# Patient Record
Sex: FEMALE | Race: WHITE | URBAN - METROPOLITAN AREA
[De-identification: names, ages, dates, MRNs, and addresses within clinical notes are randomized per-mention and may not be internally consistent; named-entity substitution may affect disease eponyms.]

---

## 2019-04-02 ENCOUNTER — INPATIENT (INPATIENT)
Facility: HOSPITAL | Age: 79
LOS: 1 days | Discharge: HOME | End: 2019-04-04
Attending: INTERNAL MEDICINE | Admitting: INTERNAL MEDICINE
Payer: MEDICARE

## 2019-04-02 VITALS
SYSTOLIC BLOOD PRESSURE: 190 MMHG | HEIGHT: 65 IN | WEIGHT: 154.98 LBS | TEMPERATURE: 98 F | DIASTOLIC BLOOD PRESSURE: 92 MMHG | RESPIRATION RATE: 18 BRPM | HEART RATE: 64 BPM | OXYGEN SATURATION: 98 %

## 2019-04-02 DIAGNOSIS — Z98.49 CATARACT EXTRACTION STATUS, UNSPECIFIED EYE: Chronic | ICD-10-CM

## 2019-04-02 LAB
ALBUMIN SERPL ELPH-MCNC: 4.8 G/DL — SIGNIFICANT CHANGE UP (ref 3.5–5.2)
ALP SERPL-CCNC: 93 U/L — SIGNIFICANT CHANGE UP (ref 30–115)
ALT FLD-CCNC: 29 U/L — SIGNIFICANT CHANGE UP (ref 0–41)
ANION GAP SERPL CALC-SCNC: 14 MMOL/L — SIGNIFICANT CHANGE UP (ref 7–14)
APPEARANCE UR: CLEAR — SIGNIFICANT CHANGE UP
AST SERPL-CCNC: 29 U/L — SIGNIFICANT CHANGE UP (ref 0–41)
BASOPHILS # BLD AUTO: 0.03 K/UL — SIGNIFICANT CHANGE UP (ref 0–0.2)
BASOPHILS NFR BLD AUTO: 0.4 % — SIGNIFICANT CHANGE UP (ref 0–1)
BILIRUB SERPL-MCNC: 0.6 MG/DL — SIGNIFICANT CHANGE UP (ref 0.2–1.2)
BILIRUB UR-MCNC: NEGATIVE — SIGNIFICANT CHANGE UP
BUN SERPL-MCNC: 11 MG/DL — SIGNIFICANT CHANGE UP (ref 10–20)
CALCIUM SERPL-MCNC: 10.2 MG/DL — HIGH (ref 8.5–10.1)
CHLORIDE SERPL-SCNC: 100 MMOL/L — SIGNIFICANT CHANGE UP (ref 98–110)
CO2 SERPL-SCNC: 27 MMOL/L — SIGNIFICANT CHANGE UP (ref 17–32)
COLOR SPEC: YELLOW — SIGNIFICANT CHANGE UP
CREAT SERPL-MCNC: 0.9 MG/DL — SIGNIFICANT CHANGE UP (ref 0.7–1.5)
DIFF PNL FLD: NEGATIVE — SIGNIFICANT CHANGE UP
EOSINOPHIL # BLD AUTO: 0.01 K/UL — SIGNIFICANT CHANGE UP (ref 0–0.7)
EOSINOPHIL NFR BLD AUTO: 0.1 % — SIGNIFICANT CHANGE UP (ref 0–8)
EPI CELLS # UR: ABNORMAL /HPF
ETHANOL SERPL-MCNC: <10 MG/DL — SIGNIFICANT CHANGE UP
GLUCOSE SERPL-MCNC: 131 MG/DL — HIGH (ref 70–99)
GLUCOSE UR QL: NEGATIVE MG/DL — SIGNIFICANT CHANGE UP
HCT VFR BLD CALC: 47.1 % — HIGH (ref 37–47)
HGB BLD-MCNC: 15.5 G/DL — SIGNIFICANT CHANGE UP (ref 12–16)
IMM GRANULOCYTES NFR BLD AUTO: 0.4 % — HIGH (ref 0.1–0.3)
KETONES UR-MCNC: NEGATIVE — SIGNIFICANT CHANGE UP
LEUKOCYTE ESTERASE UR-ACNC: ABNORMAL
LYMPHOCYTES # BLD AUTO: 0.68 K/UL — LOW (ref 1.2–3.4)
LYMPHOCYTES # BLD AUTO: 8.5 % — LOW (ref 20.5–51.1)
MCHC RBC-ENTMCNC: 31.1 PG — HIGH (ref 27–31)
MCHC RBC-ENTMCNC: 32.9 G/DL — SIGNIFICANT CHANGE UP (ref 32–37)
MCV RBC AUTO: 94.6 FL — SIGNIFICANT CHANGE UP (ref 81–99)
MONOCYTES # BLD AUTO: 0.33 K/UL — SIGNIFICANT CHANGE UP (ref 0.1–0.6)
MONOCYTES NFR BLD AUTO: 4.1 % — SIGNIFICANT CHANGE UP (ref 1.7–9.3)
NEUTROPHILS # BLD AUTO: 6.94 K/UL — HIGH (ref 1.4–6.5)
NEUTROPHILS NFR BLD AUTO: 86.5 % — HIGH (ref 42.2–75.2)
NITRITE UR-MCNC: NEGATIVE — SIGNIFICANT CHANGE UP
NRBC # BLD: 0 /100 WBCS — SIGNIFICANT CHANGE UP (ref 0–0)
PH UR: 8 — SIGNIFICANT CHANGE UP (ref 5–8)
PLATELET # BLD AUTO: 172 K/UL — SIGNIFICANT CHANGE UP (ref 130–400)
POTASSIUM SERPL-MCNC: 4.1 MMOL/L — SIGNIFICANT CHANGE UP (ref 3.5–5)
POTASSIUM SERPL-SCNC: 4.1 MMOL/L — SIGNIFICANT CHANGE UP (ref 3.5–5)
PROT SERPL-MCNC: 7.7 G/DL — SIGNIFICANT CHANGE UP (ref 6–8)
PROT UR-MCNC: ABNORMAL MG/DL
RBC # BLD: 4.98 M/UL — SIGNIFICANT CHANGE UP (ref 4.2–5.4)
RBC # FLD: 12.9 % — SIGNIFICANT CHANGE UP (ref 11.5–14.5)
SODIUM SERPL-SCNC: 141 MMOL/L — SIGNIFICANT CHANGE UP (ref 135–146)
SP GR SPEC: 1.01 — SIGNIFICANT CHANGE UP (ref 1.01–1.03)
TROPONIN T SERPL-MCNC: <0.01 NG/ML — SIGNIFICANT CHANGE UP
UROBILINOGEN FLD QL: 0.2 MG/DL — SIGNIFICANT CHANGE UP (ref 0.2–0.2)
WBC # BLD: 8.02 K/UL — SIGNIFICANT CHANGE UP (ref 4.8–10.8)
WBC # FLD AUTO: 8.02 K/UL — SIGNIFICANT CHANGE UP (ref 4.8–10.8)
WBC UR QL: SIGNIFICANT CHANGE UP /HPF

## 2019-04-02 PROCEDURE — 99285 EMERGENCY DEPT VISIT HI MDM: CPT

## 2019-04-02 PROCEDURE — 70450 CT HEAD/BRAIN W/O DYE: CPT | Mod: 26

## 2019-04-02 RX ORDER — METOCLOPRAMIDE HCL 10 MG
10 TABLET ORAL THREE TIMES A DAY
Qty: 0 | Refills: 0 | Status: DISCONTINUED | OUTPATIENT
Start: 2019-04-02 | End: 2019-04-04

## 2019-04-02 RX ORDER — ATORVASTATIN CALCIUM 80 MG/1
0 TABLET, FILM COATED ORAL
Qty: 0 | Refills: 0 | COMMUNITY

## 2019-04-02 RX ORDER — ACETAMINOPHEN 500 MG
650 TABLET ORAL ONCE
Qty: 0 | Refills: 0 | Status: COMPLETED | OUTPATIENT
Start: 2019-04-02 | End: 2019-04-02

## 2019-04-02 RX ORDER — MECLIZINE HCL 12.5 MG
25 TABLET ORAL EVERY 8 HOURS
Qty: 0 | Refills: 0 | Status: DISCONTINUED | OUTPATIENT
Start: 2019-04-02 | End: 2019-04-04

## 2019-04-02 RX ORDER — THIAMINE MONONITRATE (VIT B1) 100 MG
100 TABLET ORAL ONCE
Qty: 0 | Refills: 0 | Status: COMPLETED | OUTPATIENT
Start: 2019-04-02 | End: 2019-04-02

## 2019-04-02 RX ORDER — SOLIFENACIN SUCCINATE 10 MG/1
0 TABLET ORAL
Qty: 0 | Refills: 0 | COMMUNITY

## 2019-04-02 RX ORDER — FOLIC ACID 0.8 MG
1 TABLET ORAL ONCE
Qty: 0 | Refills: 0 | Status: COMPLETED | OUTPATIENT
Start: 2019-04-02 | End: 2019-04-02

## 2019-04-02 RX ORDER — ATORVASTATIN CALCIUM 80 MG/1
40 TABLET, FILM COATED ORAL AT BEDTIME
Qty: 0 | Refills: 0 | Status: DISCONTINUED | OUTPATIENT
Start: 2019-04-02 | End: 2019-04-04

## 2019-04-02 RX ORDER — MECLIZINE HCL 12.5 MG
50 TABLET ORAL ONCE
Qty: 0 | Refills: 0 | Status: COMPLETED | OUTPATIENT
Start: 2019-04-02 | End: 2019-04-02

## 2019-04-02 RX ORDER — CHLORHEXIDINE GLUCONATE 213 G/1000ML
1 SOLUTION TOPICAL
Qty: 0 | Refills: 0 | Status: DISCONTINUED | OUTPATIENT
Start: 2019-04-02 | End: 2019-04-04

## 2019-04-02 RX ORDER — OXYBUTYNIN CHLORIDE 5 MG
5 TABLET ORAL
Qty: 0 | Refills: 0 | Status: DISCONTINUED | OUTPATIENT
Start: 2019-04-02 | End: 2019-04-04

## 2019-04-02 RX ORDER — ACETAMINOPHEN 500 MG
650 TABLET ORAL EVERY 6 HOURS
Qty: 0 | Refills: 0 | Status: DISCONTINUED | OUTPATIENT
Start: 2019-04-02 | End: 2019-04-04

## 2019-04-02 RX ORDER — METOCLOPRAMIDE HCL 10 MG
5 TABLET ORAL ONCE
Qty: 0 | Refills: 0 | Status: COMPLETED | OUTPATIENT
Start: 2019-04-02 | End: 2019-04-02

## 2019-04-02 RX ORDER — ENOXAPARIN SODIUM 100 MG/ML
40 INJECTION SUBCUTANEOUS EVERY 24 HOURS
Qty: 0 | Refills: 0 | Status: DISCONTINUED | OUTPATIENT
Start: 2019-04-02 | End: 2019-04-04

## 2019-04-02 RX ORDER — THIAMINE MONONITRATE (VIT B1) 100 MG
100 TABLET ORAL DAILY
Qty: 0 | Refills: 0 | Status: DISCONTINUED | OUTPATIENT
Start: 2019-04-03 | End: 2019-04-04

## 2019-04-02 RX ORDER — FOLIC ACID 0.8 MG
1 TABLET ORAL DAILY
Qty: 0 | Refills: 0 | Status: DISCONTINUED | OUTPATIENT
Start: 2019-04-03 | End: 2019-04-04

## 2019-04-02 RX ADMIN — Medication 50 MILLIGRAM(S): at 14:43

## 2019-04-02 RX ADMIN — Medication 650 MILLIGRAM(S): at 14:44

## 2019-04-02 RX ADMIN — ATORVASTATIN CALCIUM 40 MILLIGRAM(S): 80 TABLET, FILM COATED ORAL at 21:55

## 2019-04-02 RX ADMIN — Medication 100 MILLIGRAM(S): at 16:01

## 2019-04-02 RX ADMIN — Medication 1 MILLIGRAM(S): at 16:01

## 2019-04-02 RX ADMIN — ENOXAPARIN SODIUM 40 MILLIGRAM(S): 100 INJECTION SUBCUTANEOUS at 21:54

## 2019-04-02 RX ADMIN — Medication 25 MILLIGRAM(S): at 21:55

## 2019-04-02 RX ADMIN — Medication 5 MILLIGRAM(S): at 14:44

## 2019-04-02 NOTE — H&P ADULT - NSHPLABSRESULTS_GEN_ALL_CORE
Complete Blood Count + Automated Diff (04.02.19 @ 12:08)    WBC Count: 8.02 K/uL    RBC Count: 4.98 M/uL    Hemoglobin: 15.5 g/dL    Hematocrit: 47.1 %    Mean Cell Volume: 94.6 fL    Mean Cell Hemoglobin: 31.1 pg    Mean Cell Hemoglobin Conc: 32.9 g/dL    Red Cell Distrib Width: 12.9 %    Platelet Count - Automated: 172 K/uL    Auto Neutrophil #: 6.94 K/uL    Auto Lymphocyte #: 0.68 K/uL    Auto Monocyte #: 0.33 K/uL    Auto Eosinophil #: 0.01 K/uL    Auto Basophil #: 0.03 K/uL    Auto Neutrophil %: 86.5: Differential percentages must be correlated with absolute numbers for  clinical significance. %    Auto Lymphocyte %: 8.5 %    Auto Monocyte %: 4.1 %    Auto Eosinophil %: 0.1 %    Auto Basophil %: 0.4 %    Auto Immature Granulocyte %: 0.4 %    Nucleated RBC: 0 /100 WBCs    Comprehensive Metabolic Panel (04.02.19 @ 12:08)    Sodium, Serum: 141 mmol/L    Potassium, Serum: 4.1 mmol/L    Chloride, Serum: 100 mmol/L    Carbon Dioxide, Serum: 27 mmol/L    Anion Gap, Serum: 14 mmol/L    Blood Urea Nitrogen, Serum: 11 mg/dL    Creatinine, Serum: 0.9 mg/dL    Glucose, Serum: 131 mg/dL    Calcium, Total Serum: 10.2 mg/dL    Protein Total, Serum: 7.7 g/dL    Albumin, Serum: 4.8 g/dL    Bilirubin Total, Serum: 0.6 mg/dL    Alkaline Phosphatase, Serum: 93 U/L    Aspartate Aminotransferase (AST/SGOT): 29 U/L    Alanine Aminotransferase (ALT/SGPT): 29 U/L    eGFR if Non : 62    eGFR if : 72 mL/min/1.73M2    Troponin T, Serum (04.02.19 @ 12:08)    Troponin T, Serum: <0.01 ng/mL    Urinalysis (04.02.19 @ 12:08)    Glucose Qualitative, Urine: Negative mg/dL    Blood, Urine: Negative    pH Urine: 8.0    Color: Yellow    Urine Appearance: Clear    Bilirubin: Negative    Ketone - Urine: Negative    Specific Gravity: 1.010    Protein, Urine: Trace mg/dL    Urobilinogen: 0.2 mg/dL    Nitrite: Negative    Leukocyte Esterase Concentration: Trace    White Blood Cell - Urine: 3-5 /HPF    Epithelial Cells: Occasional /HPF    Alcohol, Blood (04.02.19 @ 12:08)    Alcohol, Blood: <10 mg/dL    < from: 12 Lead ECG (04.02.19 @ 12:19) >  Ventricular Rate 71 BPM  Atrial Rate 71 BPM  QTC Calculation(Bezet) 471 ms  Diagnosis Line Normal sinus rhythm  Left axis deviation  Left anterior fascicular block  < end of copied text >    < from: CT Head No Cont (04.02.19 @ 14:38) >  IMPRESSION:  1.  Cerebral and cerebellar atrophy.  2.  Periventricular white matter hypodensities, nonspecific, differential   diagnostic possibilities include ischemic change, gliosis or   demyelination.  < end of copied text >

## 2019-04-02 NOTE — H&P ADULT - HISTORY OF PRESENT ILLNESS
77 y/o F PMHx DLD presenting with episode of dizziness.   Pt says she awoke at 4am and turned over in bed and felt a sudden dizziness (vertigo) with associated headache, sweating and nausea. She says it was a feeling of near unconsciousness but was aware the whole time and had no LOC. She says she stayed in bed until about 7am when she cautiously got out of bed. She says she then dropped something on the floor and when she bent down to get it she felt very lightheaded and weak as if she would pass out so she laid back down. She called her son who sent EMS.  She notes some mild vision changes during these episodes but relates them to the headaches as they resolved when the headache did.  She notes multiple additional short episodes while in the ambulance and in ED generally when moving around but none since getting medications in ED.    Pt seen by neurology PA with rec for admission for MRI to r/o CVA    ED: meclizine, reglan, tylenol, folic acid, thiamine

## 2019-04-02 NOTE — ED PROVIDER NOTE - PROGRESS NOTE DETAILS
I was directly involved in the management of this patient. Case was discussed with PA Rebecca Moraes Pt signed out to me by Dr. Chan while awaiting neuro recs.  Neuro recommend medicine admission for further evaluation.

## 2019-04-02 NOTE — ED PROVIDER NOTE - NS ED ROS FT
Review of Systems         Constitutional: (-) fever (-) chills (-) weakness       Head: (-) trauma       EENT: (+) visual changes (-) sore throat       Cardiovascular: (-) chest pain (-) syncope       Respiratory: (-) cough, (-) shortness of breath       Gastrointestinal: (-) abdominal pain (-) vomiting (-) diarrhea (+) nausea (-) constipation       Genitourinary: (-) dysuria (-) frequency       Musculoskeletal: (-) neck pain (-) back pain (-) joint pain       Integumentary: (-) rash       Neurological: (+) headache (-) altered mental status (+) dizziness (-) paresthesias       Psych: (-) psych history

## 2019-04-02 NOTE — H&P ADULT - NSHPPHYSICALEXAM_GEN_ALL_CORE
GENERAL: NAD  HEENT: NCAT, left pupil post surgical changes, reactive to light b/l   CHEST/LUNG: CTAB  HEART: Regular rate and rhythm with occasional ectopic beats ; s1 s2 appreciated, No murmurs, rubs, or gallops  ABDOMEN: Soft, Nontender, Nondistended; Bowel sounds present  EXTREMITIES: No LE edema b/l  NERVOUS SYSTEM:  Alert & Oriented X3, CN 2-12 intact, 5/5 power x4 extremities, FTN / HTS intact, negative Dysdiadochokinesia, gait not checked.

## 2019-04-02 NOTE — ED PROVIDER NOTE - PHYSICAL EXAMINATION
Physical Exam    Vital Signs: I have reviewed the initial vital signs  Constitutional: well-nourished, appears stated age, no acute distress  HEENT: Conjunctiva pink, Sclera clear, PERRLA, EOMI, difficulty in tracking to the right. Mucous membranes moist, no exudates or lesions noted, uvula midline.  Cardiovascular: S1 and S2 present, regular rate, regular rhythm  Respiratory: unlabored respiratory effort, clear to auscultation bilaterally no wheezing, rales and rhonchi  Integumentary: warm, dry, no rash  Neurologic: A & O x 3, CN II-XII grossly intact, all extremities’ motor and sensory functions grossly intact. Finger to nose intact, rapid alternating movements intact, Unsteady gait, Heel and toe walk intact, heel to shin intact, no nystagmus noted  Psychiatric: appropriate mood, appropriate affect

## 2019-04-02 NOTE — ED ADULT NURSE NOTE - NSIMPLEMENTINTERV_GEN_ALL_ED
Implemented All Fall with Harm Risk Interventions:  [place] to call system. Call bell, personal items and telephone within reach. Instruct patient to call for assistance. Room bathroom lighting operational. Non-slip footwear when patient is off stretcher. Physically safe environment: no spills, clutter or unnecessary equipment. Stretcher in lowest position, wheels locked, appropriate side rails in place. Provide visual cue, wrist band, yellow gown, etc. Monitor gait and stability. Monitor for mental status changes and reorient to person, place, and time. Review medications for side effects contributing to fall risk. Reinforce activity limits and safety measures with patient and family. Provide visual clues: red socks.

## 2019-04-02 NOTE — H&P ADULT - ASSESSMENT
77 y/o F PMHx DLD presenting with episode of dizziness.    # Dizziness  -worsened with positional changes  -NIHSS= 0  -improved with meclizine  -Neuro recs appreciated  -admit to medicine to r/o CVA  -order MRI brain, MRA head and neck (neck with ADELINA)    # EtOH use  -pt notes only on weekends but per ED notes family says daily intake  -alcohol level <10  -will check folate and B12  -c/w thiamine and folate for now    # HLD   -c/w lipitor    # Urinary Incontinence   -oxybutynin in place of vesicare    # DVT ppx  -lovenox    # Diet  -low cholesterol    # Activity  -ambulate with assistance    # Code Status  -Full Code    # Dispo  -from home, d/c when medically stable 79 y/o F PMHx DLD presenting with episode of dizziness.    # Dizziness  -worsened with positional changes  -NIHSS= 0  -improved with meclizine  -CTH with likely age related changes  -Neuro recs appreciated  -admit to medicine to r/o CVA  -order MRI brain, MRA head and neck (neck with ADELINA)  -c/w meclizine, reglan PRN   -neuro follow up    # EtOH use  -pt notes only on weekends but per ED notes family says daily intake  -alcohol level <10  -will check folate and B12  -c/w thiamine and folate for now    # HLD   -c/w lipitor    # Urinary Incontinence   -oxybutynin in place of vesicare    # DVT ppx  -lovenox    # Diet  -low cholesterol    # Activity  -ambulate with assistance    # Code Status  -Full Code    # Dispo  -from home, d/c when medically stable

## 2019-04-02 NOTE — ED ADULT NURSE NOTE - OBJECTIVE STATEMENT
pt presents with dizziness, lightheadedness, nausea, headache, and states "feels like the room is spinning." denies fever, vomiting. started this morning at 4 AM.

## 2019-04-02 NOTE — PATIENT PROFILE ADULT - NSPROGENARRIVEDFROM_GEN_A_NUR
Patient advised that Levothyroxine Refilled for 6 months. Please let patient know TSH is stable.    home

## 2019-04-02 NOTE — CONSULT NOTE ADULT - SUBJECTIVE AND OBJECTIVE BOX
HPI:  79 y/o female with PMH hyperlipidemia, urinary incontinence presents with dizziness and near syncope which occurred at 4 AM today. Sx became worse when she stood up. The sensation of spinning woke patient out of her sleep? and then she felt like she was going to pass out.  Was treated in ED with meclizine, reglan, folic acid and thiamine and is currently asymptomatic.  Additionally, pt c/o b/l floaters, intermittent for 1 year since having eye surgery for cataract    PAST MEDICAL & SURGICAL HISTORY:  High cholesterol  Incontinence: urinary  No significant past surgical history    Social Hx:  no smoking  + ETOH 3 glasses of wine 3x/week. Last drink 5 days ago    Home Medications:  Lipitor:  (02 Apr 2019 12:09)  VESIcare:  (02 Apr 2019 12:09)    ROS:  Constitutional, Neurological, Psychiatric, Eyes, ENT, Cardiovascular, Respiratory, Gastrointestinal, Genitourinary, Musculoskeletal, Integumentary, Endocrine and Heme/Lymph are otherwise negative.     Vital Signs Last 24 Hrs  T(C): 36.7 (02 Apr 2019 10:56), Max: 36.7 (02 Apr 2019 10:56)  T(F): 98 (02 Apr 2019 10:56), Max: 98 (02 Apr 2019 10:56)  HR: 64 (02 Apr 2019 10:56) (64 - 64)  BP: 190/92 (02 Apr 2019 10:56) (190/92 - 190/92)  RR: 18 (02 Apr 2019 10:56) (18 - 18)  SpO2: 98% (02 Apr 2019 10:56) (98% - 98%)    no orthostatic changes    Neuro Exam:  Orientation: oriented to person, oriented to place and oriented to time.   Language: no difficulty naming common objects, no difficulty repeating a phrase  Cranial Nerves: visual acuity intact bilaterally, visual fields full to confrontation, pupils equal round and reactive to light, extraocular motion intact, facial sensation intact symmetrically, face symmetrical, hearing was intact bilaterally, tongue and palate midline, head turning and shoulder shrug symmetric and there was no tongue deviation with protrusion.   Motor: muscle tone was normal in all four extremities, muscle strength was normal in all four extremities and normal bulk in all four extremities.   Sensory exam: light touch was intact.   Coordination:. normal gait. balance was intact. there was no past-pointing. no tremor present.   Deep tendon reflexes:   Biceps right 2+. Biceps left 2+.    Triceps right 2+. Triceps left 2+.   Brachioradialis right 2+. Brachioradialis left 2+.    Patella right 3+. Patella left 3+.    Ankle jerk right 2+. Ankle jerk left 2+.     NIHSS: 0    Allergies    No Known Allergies    Intolerances      MEDICATIONS  (STANDING):    MEDICATIONS  (PRN):      LABS:                        15.5   8.02  )-----------( 172      ( 02 Apr 2019 12:08 )             47.1     04-02    141  |  100  |  11  ----------------------------<  131<H>  4.1   |  27  |  0.9    Ca    10.2<H>      02 Apr 2019 12:08    TPro  7.7  /  Alb  4.8  /  TBili  0.6  /  DBili  x   /  AST  29  /  ALT  29  /  AlkPhos  93  04-02      Neuro Imaging:    < from: CT Head No Cont (04.02.19 @ 14:38) >    The third and lateral ventricles are mildly enlarged as are the cortical   sulci consistent with a mild degree of cortical atrophy.  The fourth   ventricle is normal in size and position.    There is no shift of the midline structures, evidence of acute   intracranial hemorrhage, or depressed skull fracture.    Patchy foci of diminished attenuation in the periventricular white   matter. These findings are nonspecific in appearance and differential   diagnostic possibilities include ischemic change of undetermined age,   foci of gliosis or demyelination.    Minimal widening of the cerebellopontine angle cisterns and minimal   prominence of thecerebellar folia consistent with a minimal degree of   cerebellar atrophy.    The right frontal sinus is hypoplastic.    IMPRESSION:    1.  Cerebral and cerebellar atrophy.    2.  Periventricular white matter hypodensities, nonspecific, differential   diagnostic possibilities include ischemic change, gliosis or   demyelination.    < end of copied text > HPI:  79 y/o female with PMH hyperlipidemia, urinary incontinence presents with dizziness and near syncope which occurred at 4 AM today. Sx became worse when she stood up. The sensation of spinning woke patient out of her sleep? and then she felt like she was going to pass out.  Was treated in ED with meclizine, reglan, folic acid and thiamine and is currently asymptomatic.  Additionally, pt c/o b/l floaters, intermittent for 1 year since having eye surgery for cataract. Legally blind in right eye.    PAST MEDICAL & SURGICAL HISTORY:  High cholesterol  Incontinence: urinary  right eye blindness  No significant past surgical history    Social Hx:  no smoking  + ETOH 3 glasses of wine 3x/week. Last drink 5 days ago    Home Medications:  Lipitor:  (02 Apr 2019 12:09)  VESIcare:  (02 Apr 2019 12:09)    ROS:  Constitutional, Neurological, Psychiatric, Eyes, ENT, Cardiovascular, Respiratory, Gastrointestinal, Genitourinary, Musculoskeletal, Integumentary, Endocrine and Heme/Lymph are otherwise negative.     Vital Signs Last 24 Hrs  T(C): 36.7 (02 Apr 2019 10:56), Max: 36.7 (02 Apr 2019 10:56)  T(F): 98 (02 Apr 2019 10:56), Max: 98 (02 Apr 2019 10:56)  HR: 64 (02 Apr 2019 10:56) (64 - 64)  BP: 190/92 (02 Apr 2019 10:56) (190/92 - 190/92)  RR: 18 (02 Apr 2019 10:56) (18 - 18)  SpO2: 98% (02 Apr 2019 10:56) (98% - 98%)    no orthostatic changes    Neuro Exam:  Orientation: oriented to person, oriented to place and oriented to time.   Language: no difficulty naming common objects, no difficulty repeating a phrase  Cranial Nerves: right pupil irregularly shaped (post surgically), extraocular motion intact, facial sensation intact symmetrically, face symmetrical, hearing was intact bilaterally, tongue and palate midline, head turning and shoulder shrug symmetric and there was no tongue deviation with protrusion. no donte visual field cuts  Motor: muscle tone was normal in all four extremities, muscle strength was normal in all four extremities and normal bulk in all four extremities.   Sensory exam: light touch was intact.   Coordination:. normal gait. balance was intact. there was no past-pointing. no tremor present.   Deep tendon reflexes:   Biceps right 2+. Biceps left 2+.    Triceps right 2+. Triceps left 2+.   Brachioradialis right 2+. Brachioradialis left 2+.    Patella right 3+. Patella left 3+.    Ankle jerk right 2+. Ankle jerk left 2+.     NIHSS: 0    Allergies    No Known Allergies    Intolerances      MEDICATIONS  (STANDING):    MEDICATIONS  (PRN):      LABS:                        15.5   8.02  )-----------( 172      ( 02 Apr 2019 12:08 )             47.1     04-02    141  |  100  |  11  ----------------------------<  131<H>  4.1   |  27  |  0.9    Ca    10.2<H>      02 Apr 2019 12:08    TPro  7.7  /  Alb  4.8  /  TBili  0.6  /  DBili  x   /  AST  29  /  ALT  29  /  AlkPhos  93  04-02      Neuro Imaging:    < from: CT Head No Cont (04.02.19 @ 14:38) >    The third and lateral ventricles are mildly enlarged as are the cortical   sulci consistent with a mild degree of cortical atrophy.  The fourth   ventricle is normal in size and position.    There is no shift of the midline structures, evidence of acute   intracranial hemorrhage, or depressed skull fracture.    Patchy foci of diminished attenuation in the periventricular white   matter. These findings are nonspecific in appearance and differential   diagnostic possibilities include ischemic change of undetermined age,   foci of gliosis or demyelination.    Minimal widening of the cerebellopontine angle cisterns and minimal   prominence of thecerebellar folia consistent with a minimal degree of   cerebellar atrophy.    The right frontal sinus is hypoplastic.    IMPRESSION:    1.  Cerebral and cerebellar atrophy.    2.  Periventricular white matter hypodensities, nonspecific, differential   diagnostic possibilities include ischemic change, gliosis or   demyelination.    < end of copied text > HPI:  79 y/o female with PMH hyperlipidemia, urinary incontinence presents with dizziness and near syncope which occurred at 4 AM today. Sx became worse when she stood up. The sensation of spinning woke patient out of her sleep? and then she felt like she was going to pass out.  Was treated in ED with meclizine, reglan, folic acid and thiamine and is currently asymptomatic.  Additionally, pt c/o b/l floaters, intermittent for 1 year since having eye surgery for cataract. 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 Last drink 5 days ago    Home Medications:  Lipitor:  (02 Apr 2019 12:09)  VESIcare:  (02 Apr 2019 12:09)    ROS:  Constitutional, Neurological, Psychiatric, Eyes, ENT, Cardiovascular, Respiratory, Gastrointestinal, Genitourinary, Musculoskeletal, Integumentary, Endocrine and Heme/Lymph are otherwise negative.     Vital Signs Last 24 Hrs  T(C): 36.7 (02 Apr 2019 10:56), Max: 36.7 (02 Apr 2019 10:56)  T(F): 98 (02 Apr 2019 10:56), Max: 98 (02 Apr 2019 10:56)  HR: 64 (02 Apr 2019 10:56) (64 - 64)  BP: 190/92 (02 Apr 2019 10:56) (190/92 - 190/92)  RR: 18 (02 Apr 2019 10:56) (18 - 18)  SpO2: 98% (02 Apr 2019 10:56) (98% - 98%)    no orthostatic changes    Neuro Exam:  Orientation: oriented to person, oriented to place and oriented to time.   Language: no difficulty naming common objects, no difficulty repeating a phrase  Cranial Nerves: right pupil irregularly shaped (post surgically), extraocular motion intact, facial sensation intact symmetrically, face symmetrical, hearing was intact bilaterally, tongue and palate midline, head turning and shoulder shrug symmetric and there was no tongue deviation with protrusion. no donte visual field cuts  Motor: muscle tone was normal in all four extremities, muscle strength was normal in all four extremities and normal bulk in all four extremities.   Sensory exam: light touch was intact.   Coordination:. normal gait. balance was intact. there was no past-pointing. no tremor present.   Deep tendon reflexes:   Biceps right 2+. Biceps left 2+.    Triceps right 2+. Triceps left 2+.   Brachioradialis right 2+. Brachioradialis left 2+.    Patella right 3+. Patella left 3+.    Ankle jerk right 2+. Ankle jerk left 2+.     NIHSS: 0    Allergies    No Known Allergies    Intolerances      MEDICATIONS  (STANDING):    MEDICATIONS  (PRN):      LABS:                        15.5   8.02  )-----------( 172      ( 02 Apr 2019 12:08 )             47.1     04-02    141  |  100  |  11  ----------------------------<  131<H>  4.1   |  27  |  0.9    Ca    10.2<H>      02 Apr 2019 12:08    TPro  7.7  /  Alb  4.8  /  TBili  0.6  /  DBili  x   /  AST  29  /  ALT  29  /  AlkPhos  93  04-02      Neuro Imaging:    < from: CT Head No Cont (04.02.19 @ 14:38) >    The third and lateral ventricles are mildly enlarged as are the cortical   sulci consistent with a mild degree of cortical atrophy.  The fourth   ventricle is normal in size and position.    There is no shift of the midline structures, evidence of acute   intracranial hemorrhage, or depressed skull fracture.    Patchy foci of diminished attenuation in the periventricular white   matter. These findings are nonspecific in appearance and differential   diagnostic possibilities include ischemic change of undetermined age,   foci of gliosis or demyelination.    Minimal widening of the cerebellopontine angle cisterns and minimal   prominence of thecerebellar folia consistent with a minimal degree of   cerebellar atrophy.    The right frontal sinus is hypoplastic.    IMPRESSION:    1.  Cerebral and cerebellar atrophy.    2.  Periventricular white matter hypodensities, nonspecific, differential   diagnostic possibilities include ischemic change, gliosis or   demyelination.    < end of copied text > HPI:  79 y/o female with PMH hyperlipidemia, urinary incontinence presents with dizziness and near syncope which occurred at 4 AM today. Sx became worse when she stood up. The sensation of spinning woke patient out of her sleep? and then she felt like she was going to pass out.  Was treated in ED with meclizine, reglan, folic acid and thiamine and is currently asymptomatic.  Additionally, pt c/o b/l floaters, intermittent for 1 year since having eye surgery for cataract. 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 Last drink 5 days ago    Home Medications:  Lipitor:  (02 Apr 2019 12:09)  VESIcare:  (02 Apr 2019 12:09)    ROS:  Constitutional, Neurological, Psychiatric, Eyes, ENT, Cardiovascular, Respiratory, Gastrointestinal, Genitourinary, Musculoskeletal, Integumentary, Endocrine and Heme/Lymph are otherwise negative.     Vital Signs Last 24 Hrs  T(C): 36.7 (02 Apr 2019 10:56), Max: 36.7 (02 Apr 2019 10:56)  T(F): 98 (02 Apr 2019 10:56), Max: 98 (02 Apr 2019 10:56)  HR: 64 (02 Apr 2019 10:56) (64 - 64)  BP: 190/92 (02 Apr 2019 10:56) (190/92 - 190/92)  RR: 18 (02 Apr 2019 10:56) (18 - 18)  SpO2: 98% (02 Apr 2019 10:56) (98% - 98%)    no orthostatic changes    Neuro Exam:  Orientation: oriented to person, oriented to place and oriented to time.   Language: no difficulty naming common objects, no difficulty repeating a phrase  Cranial Nerves: right pupil irregularly shaped (post surgically), extraocular motion intact, facial sensation intact symmetrically, face symmetrical, hearing was intact bilaterally, tongue and palate midline, head turning and shoulder shrug symmetric and there was no tongue deviation with protrusion. no donte visual field cuts  Motor: muscle tone was normal in all four extremities, muscle strength was normal in all four extremities and normal bulk in all four extremities.   Sensory exam: light touch was intact.   Coordination:. normal gait. balance was intact. there was no past-pointing. no tremor present.   Deep tendon reflexes:   Biceps right 2+. Biceps left 2+.    Triceps right 2+. Triceps left 2+.   Brachioradialis right 2+. Brachioradialis left 2+.    Patella right 2+. Patella left 2+.    Ankle jerk right 2+. Ankle jerk left 2+.     NIHSS: 0    Allergies    No Known Allergies    Intolerances      MEDICATIONS  (STANDING):    MEDICATIONS  (PRN):      LABS:                        15.5   8.02  )-----------( 172      ( 02 Apr 2019 12:08 )             47.1     04-02    141  |  100  |  11  ----------------------------<  131<H>  4.1   |  27  |  0.9    Ca    10.2<H>      02 Apr 2019 12:08    TPro  7.7  /  Alb  4.8  /  TBili  0.6  /  DBili  x   /  AST  29  /  ALT  29  /  AlkPhos  93  04-02      Neuro Imaging:    < from: CT Head No Cont (04.02.19 @ 14:38) >    The third and lateral ventricles are mildly enlarged as are the cortical   sulci consistent with a mild degree of cortical atrophy.  The fourth   ventricle is normal in size and position.    There is no shift of the midline structures, evidence of acute   intracranial hemorrhage, or depressed skull fracture.    Patchy foci of diminished attenuation in the periventricular white   matter. These findings are nonspecific in appearance and differential   diagnostic possibilities include ischemic change of undetermined age,   foci of gliosis or demyelination.    Minimal widening of the cerebellopontine angle cisterns and minimal   prominence of thecerebellar folia consistent with a minimal degree of   cerebellar atrophy.    The right frontal sinus is hypoplastic.    IMPRESSION:    1.  Cerebral and cerebellar atrophy.    2.  Periventricular white matter hypodensities, nonspecific, differential   diagnostic possibilities include ischemic change, gliosis or   demyelination.    < end of copied text >

## 2019-04-02 NOTE — ED PROVIDER NOTE - OBJECTIVE STATEMENT
78 year old female hx HLD presenting with dizziness x 7 hours. Pt states she was in bed and felt dizzy when she rolled over. She states she does not know if she passed out. She states the dizziness which is both room spinning and lightheadedness is worse with moving her head, nothing makes it better. Has not taken any medicine. Admits to longstanding black spots in her vision, last time being 1 week ago. Denies confusion, fevers, chills, weakness, numbness, tingling, chest pain, abdominal pain, vomiting. She does admit to nausea and headache but denies trauma. PCP is Kanwal. Son states patient is an everyday drinker but patient denies drinking yesterday. No hx of admissions for alcohol withdrawal. Legally blind in right eye.

## 2019-04-02 NOTE — H&P ADULT - NSHPSOCIALHISTORY_GEN_ALL_CORE
No smoking or drugs  Has 2-3 glasses (aprox 8oz) of wine on all days of the weekend (Fri/Sat/Sun), per ED chart trista notes daily drinking  Lives alone, independent with ADLs,   Ambulates without assistance

## 2019-04-02 NOTE — CONSULT NOTE ADULT - ASSESSMENT
Impression:  79 y/o woman with complaint of near syncope and intermittent eye floaters b/l    Plan:  admit to Med/Surg  MRI brain without courtney  MRA neck with courtney  MRA head without courtney  REEG  check B12/Folate  continue Thiamine  Pt wanting to leave AMA, discussed w/ family and ED staff above recommendations Impression:  79 y/o woman with complaint of near syncope and intermittent eye floaters b/l    Plan:  admit to Med/Surg  MRI brain without courtney  MRA neck with courtney  MRA head without courtney  REEG  check B12/Folate  continue Thiamine  avoid rapid treatment of BP. initial target -180  Pt wanting to leave AMA, discussed w/ family and ED staff above recommendations Impression:  77 y/o woman with complaint of near syncope and intermittent eye floaters b/l r/o posterior circulation pathology, possible peripheral vertigo    Plan:  admit to Med/Surg  MRI brain without courtney  MRA neck with courtney  MRA head without courtney  check B12/Folate  continue Thiamine  avoid rapid treatment of BP. initial target -180  if MRI/MRA (-), may d/c with outpt f/u in 2 - 4 wks  meclizine 25 mg TID PRN

## 2019-04-02 NOTE — ED PROVIDER NOTE - ATTENDING CONTRIBUTION TO CARE
dizziness for 7 hrs that occurred when she was rolling around in bed, felt intense feeling she closed her eyes, did not loose consciousness, no headaceh, no tinnitus, she did have vomiting and nausea. this occurred 2 other times associated with head turning and then with bending forward however she states when she walks her gait is not nml. on exam she states she feels like he is leanign to left when walking but ths is not observed, finger nose nml, she is blind in right eye, no clear nystagmus noted, cn otherwise are intact, imp: vertigo, ct head, check labs, and discuss with neuro.

## 2019-04-02 NOTE — ED PROVIDER NOTE - CLINICAL SUMMARY MEDICAL DECISION MAKING FREE TEXT BOX
78yF DLD p/w dizziness and unstable gait, hx EtOH abuse.  Sx improved w/ meclizine. CTH w/o acute abnormality.  Neuro consulted, will adm to medicine for further workup.

## 2019-04-03 LAB
ANION GAP SERPL CALC-SCNC: 14 MMOL/L — SIGNIFICANT CHANGE UP (ref 7–14)
BUN SERPL-MCNC: 10 MG/DL — SIGNIFICANT CHANGE UP (ref 10–20)
CALCIUM SERPL-MCNC: 9.9 MG/DL — SIGNIFICANT CHANGE UP (ref 8.5–10.1)
CHLORIDE SERPL-SCNC: 101 MMOL/L — SIGNIFICANT CHANGE UP (ref 98–110)
CO2 SERPL-SCNC: 26 MMOL/L — SIGNIFICANT CHANGE UP (ref 17–32)
CREAT SERPL-MCNC: 0.8 MG/DL — SIGNIFICANT CHANGE UP (ref 0.7–1.5)
FOLATE SERPL-MCNC: >20 NG/ML — SIGNIFICANT CHANGE UP
GLUCOSE SERPL-MCNC: 83 MG/DL — SIGNIFICANT CHANGE UP (ref 70–99)
POTASSIUM SERPL-MCNC: 4.6 MMOL/L — SIGNIFICANT CHANGE UP (ref 3.5–5)
POTASSIUM SERPL-SCNC: 4.6 MMOL/L — SIGNIFICANT CHANGE UP (ref 3.5–5)
SODIUM SERPL-SCNC: 141 MMOL/L — SIGNIFICANT CHANGE UP (ref 135–146)
VIT B12 SERPL-MCNC: 917 PG/ML — SIGNIFICANT CHANGE UP (ref 232–1245)

## 2019-04-03 PROCEDURE — 70548 MR ANGIOGRAPHY NECK W/DYE: CPT | Mod: 26

## 2019-04-03 PROCEDURE — 70551 MRI BRAIN STEM W/O DYE: CPT | Mod: 26

## 2019-04-03 PROCEDURE — 70544 MR ANGIOGRAPHY HEAD W/O DYE: CPT | Mod: 26,59

## 2019-04-03 RX ADMIN — Medication 5 MILLIGRAM(S): at 05:19

## 2019-04-03 RX ADMIN — Medication 100 MILLIGRAM(S): at 12:57

## 2019-04-03 RX ADMIN — Medication 5 MILLIGRAM(S): at 18:55

## 2019-04-03 RX ADMIN — ENOXAPARIN SODIUM 40 MILLIGRAM(S): 100 INJECTION SUBCUTANEOUS at 21:19

## 2019-04-03 RX ADMIN — ATORVASTATIN CALCIUM 40 MILLIGRAM(S): 80 TABLET, FILM COATED ORAL at 21:20

## 2019-04-03 RX ADMIN — Medication 1 MILLIGRAM(S): at 12:57

## 2019-04-03 NOTE — PROGRESS NOTE ADULT - SUBJECTIVE AND OBJECTIVE BOX
HERACLIO ACOSTA  78y  Female      SUBJECTIVE:  C/o got dizzy,headache and nausea with turning in bed. Some spinning sensation.  Later got out of bed and bend down  and again felt Sx but no Loc.  Attending Admission  Note:  PMH:  URINARY  INCONTINENCE  HYPERLIPIDEMIA    REVIEW OF SYSTEMS:    T(C): 36.2 (04-03-19 @ 05:19), Max: 37.1 (04-02-19 @ 21:00)  HR: 65 (04-03-19 @ 05:19) (64 - 71)  BP: 132/67 (04-03-19 @ 05:19) (132/67 - 190/92)  RR: 18 (04-03-19 @ 05:19) (18 - 18)  SpO2: 98% (04-02-19 @ 23:52) (98% - 98%)  Wt(kg): --Vital Signs Last 24 Hrs  T(C): 36.2 (03 Apr 2019 05:19), Max: 37.1 (02 Apr 2019 21:00)  T(F): 97.1 (03 Apr 2019 05:19), Max: 98.7 (02 Apr 2019 21:00)  HR: 65 (03 Apr 2019 05:19) (64 - 71)  BP: 132/67 (03 Apr 2019 05:19) (132/67 - 190/92)  BP(mean): --  RR: 18 (03 Apr 2019 05:19) (18 - 18)      SpO2: 98% (02 Apr 2019 23:52) (98% - 98%)    PHYSICAL EXAM:  HEENT-EOMS INTACT  LUNGS-CLEAR   COR-REGULAR,NL S1 & S2  ABDOMEN-SOFT,NON TENDER  NEURO-ALERT AND ORIENTED  CN INTACT  CEREBELLAR AND MOTOR INTACT  LABS:                        15.5   8.02  )-----------( 172      ( 02 Apr 2019 12:08 )             47.1   04-02    141  |  100  |  11  ----------------------------<  131<H>  4.1   |  27  |  0.9    Ca    10.2<H>      02 Apr 2019 12:08    TPro  7.7  /  Alb  4.8  /  TBili  0.6  /  DBili  x   /  AST  29  /  ALT  29  /  AlkPhos  93  04-02    Troponin T, Serum: <0.01 ng/mL (04.02.19 @ 12:08)      RADIOLOGY:  < from: CT Head No Cont (04.02.19 @ 14:38) >    EXAM:  CT BRAIN            PROCEDURE DATE:  04/02/2019            INTERPRETATION:  Clinical History / Reason for exam: Dizziness.    CT SCAN OF THE BRAIN WITHOUT CONTRAST    TECHNIQUE:    Multiple transaxial noncontrast CT images of the brain were obtained from   base to vertex. Sagittal and coronal reformatted images were obtained.    FINDINGS:    The third and lateral ventricles are mildly enlarged as are the cortical   sulci consistent with a mild degree of cortical atrophy.  The fourth   ventricle is normal in size and position.    There is no shift of the midline structures, evidence of acute   intracranial hemorrhage, or depressed skull fracture.    Patchy foci of diminished attenuation in the periventricular white   matter. These findings are nonspecific in appearance and differential   diagnostic possibilities include ischemic change of undetermined age,   foci of gliosis or demyelination.    Minimal widening of the cerebellopontine angle cisterns and minimal   prominence of thecerebellar folia consistent with a minimal degree of   cerebellar atrophy.    The right frontal sinus is hypoplastic.    IMPRESSION:    1.  Cerebral and cerebellar atrophy.    2.  Periventricular white matter hypodensities, nonspecific, differential   diagnostic possibilities include ischemic change, gliosis or   demyelination.                  TAMI PINO M.D., ATTENDING RADIOLOGIST  This document has been electronically signed. Apr 2 2019  2:59PM              < end of copied text >  < from: 12 Lead ECG (04.02.19 @ 12:19) >  Ventricular Rate 71 BPM    Atrial Rate 71 BPM    P-R Interval 160 ms    QRS Duration 94 ms    Q-T Interval 434 ms    QTC Calculation(Bezet) 471 ms    P Axis 33 degrees    R Axis -37 degrees    T Axis 44 degrees    Diagnosis Line Normal sinus rhythm  Left axis deviation  Left anterior fascicular block  Abnormal ECG    Confirmed by ALDEN CUBA MD ([id number]) on 4/2/2019 3:14:16 PM    < end of copied text >      IMPRESSION:  VERTIGO  URINARY INCONTINENCE  HYPERLIPIDEMIA        PLAN:  MECLIZINE  NEURO CONSULT  MRI/MRA OF HEAD AND NECK AS PER NEURO  I SPENT A TOTAL OF 45 MINS. EXAMINING,EVALUATING,COUNSELING PATIENT AND COORDINATING CARE

## 2019-04-03 NOTE — PROGRESS NOTE ADULT - ASSESSMENT
HERACLIO ACOSTA 78y Female  MRN#: [id number]         SUBJECTIVE  Patient is a 78y old Female who presents with a chief complaint of Dizziness (2019 08:26)  Currently admitted to medicine with the primary diagnosis of Dizziness    Today is hospital day 1d, no acute events overnight. continues to have dizziness when changing positions this morning (i.e. switching     OBJECTIVE  Home Medications:  Lipitor:  (2019 12:09)  VESIcare:  (2019 12:09)    MEDICATIONS:  STANDING MEDICATIONS  atorvastatin 40 milliGRAM(s) Oral at bedtime  chlorhexidine 4% Liquid 1 Application(s) Topical <User Schedule>  enoxaparin Injectable 40 milliGRAM(s) SubCutaneous every 24 hours  folic acid 1 milliGRAM(s) Oral daily  oxybutynin 5 milliGRAM(s) Oral two times a day  thiamine 100 milliGRAM(s) Oral daily    PRN MEDICATIONS  acetaminophen   Tablet .. 650 milliGRAM(s) Oral every 6 hours PRN  meclizine 25 milliGRAM(s) Oral every 8 hours PRN  metoclopramide 10 milliGRAM(s) Oral three times a day PRN      VITAL SIGNS: Last 24 Hours  T(C): 36.2 (2019 05:19), Max: 37.1 (2019 21:00)  T(F): 97.1 (2019 05:19), Max: 98.7 (2019 21:00)  HR: 65 (2019 05:19) (65 - 71)  BP: 132/67 (2019 05:19) (132/67 - 143/70)  BP(mean): --  RR: 18 (2019 05:19) (18 - 18)  SpO2: 98% (2019 23:52) (98% - 98%)    LABS:                        15.5   8.02  )-----------( 172      ( 2019 12:08 )             47.1         141  |  101  |  10  ----------------------------<  83  4.6   |  26  |  0.8    Ca    9.9      2019 08:30    TPro  7.7  /  Alb  4.8  /  TBili  0.6  /  DBili  x   /  AST  29  /  ALT  29  /  AlkPhos  93  04-02    LIVER FUNCTIONS - ( 2019 12:08 )  Alb: 4.8 g/dL / Pro: 7.7 g/dL / ALK PHOS: 93 U/L / ALT: 29 U/L / AST: 29 U/L / GGT: x             Urinalysis Basic - ( 2019 12:08 )    Color: Yellow / Appearance: Clear / S.010 / pH: x  Gluc: x / Ketone: Negative  / Bili: Negative / Urobili: 0.2 mg/dL   Blood: x / Protein: Trace mg/dL / Nitrite: Negative   Leuk Esterase: Trace / RBC: x / WBC 3-5 /HPF   Sq Epi: x / Non Sq Epi: Occasional /HPF / Bacteria: x            CARDIAC MARKERS ( 2019 12:08 )  x     / <0.01 ng/mL / x     / x     / x          RADIOLOGY:      PHYSICAL EXAM:    GENERAL: NAD, well-developed, AAOx3  HEENT:  Atraumatic, Normocephalic. EOMI, PERRLA, conjunctiva and sclera clear, No JVD  PULMONARY: Clear to auscultation bilaterally; No wheeze  CARDIOVASCULAR: Regular rate and rhythm; No murmurs, rubs, or gallops  GASTROINTESTINAL: Soft, Nontender, Nondistended; Bowel sounds present  MUSCULOSKELETAL:  2+ Peripheral Pulses, No clubbing, cyanosis, or edema  NEUROLOGY: non-focal  SKIN: No rashes or lesions      ADMISSION SUMMARY  Patient is a 78y old Female who presents with a chief complaint of Dizziness (2019 08:26)  Currently admitted to medicine with the primary diagnosis of Dizziness  Hospital course has been complicated by _______.       ASSESSMENT & PLAN HERACLIO ACOSTA 78y Female  MRN#: [id number]         SUBJECTIVE  Patient is a 78y old Female who presents with a chief complaint of Dizziness (2019 08:26)  Currently admitted to medicine with the primary diagnosis of Dizziness    Today is hospital day 1d, no acute events overnight. Reporting feeling dizzy when walking to the bathroom last night.Continues to have dizziness when changing positions this morning (i.e. switching from laying to down to sitting, and from sitting to standing). Reports associated nausea with dizziness.  No changes in vision, review of systems otherwise negative.     OBJECTIVE  Home Medications:  Lipitor:  (2019 12:09)  VESIcare:  (2019 12:09)    MEDICATIONS:  STANDING MEDICATIONS  atorvastatin 40 milliGRAM(s) Oral at bedtime  chlorhexidine 4% Liquid 1 Application(s) Topical <User Schedule>  enoxaparin Injectable 40 milliGRAM(s) SubCutaneous every 24 hours  folic acid 1 milliGRAM(s) Oral daily  oxybutynin 5 milliGRAM(s) Oral two times a day  thiamine 100 milliGRAM(s) Oral daily    PRN MEDICATIONS  acetaminophen   Tablet .. 650 milliGRAM(s) Oral every 6 hours PRN  meclizine 25 milliGRAM(s) Oral every 8 hours PRN  metoclopramide 10 milliGRAM(s) Oral three times a day PRN      VITAL SIGNS: Last 24 Hours  T(C): 36.2 (2019 05:19), Max: 37.1 (2019 21:00)  T(F): 97.1 (2019 05:19), Max: 98.7 (2019 21:00)  HR: 65 (2019 05:19) (65 - 71)  BP: 132/67 (2019 05:19) (132/67 - 143/70)  BP(mean): --  RR: 18 (2019 05:19) (18 - 18)  SpO2: 98% (2019 23:52) (98% - 98%)    LABS:                        15.5   8.02  )-----------( 172      ( 2019 12:08 )             47.1     04-03    141  |  101  |  10  ----------------------------<  83  4.6   |  26  |  0.8    Ca    9.9      2019 08:30    TPro  7.7  /  Alb  4.8  /  TBili  0.6  /  DBili  x   /  AST  29  /  ALT  29  /  AlkPhos  93  04-02    LIVER FUNCTIONS - ( 2019 12:08 )  Alb: 4.8 g/dL / Pro: 7.7 g/dL / ALK PHOS: 93 U/L / ALT: 29 U/L / AST: 29 U/L / GGT: x             Urinalysis Basic - ( 2019 12:08 )    Color: Yellow / Appearance: Clear / S.010 / pH: x  Gluc: x / Ketone: Negative  / Bili: Negative / Urobili: 0.2 mg/dL   Blood: x / Protein: Trace mg/dL / Nitrite: Negative   Leuk Esterase: Trace / RBC: x / WBC 3-5 /HPF   Sq Epi: x / Non Sq Epi: Occasional /HPF / Bacteria: x            CARDIAC MARKERS ( 2019 12:08 )  x     / <0.01 ng/mL / x     / x     / x          RADIOLOGY:  < from: CT Head No Cont (19 @ 14:38) >  IMPRESSION:    1.  Cerebral and cerebellar atrophy.    2.  Periventricular white matter hypodensities, nonspecific, differential   diagnostic possibilities include ischemic change, gliosis or   demyelination.    PHYSICAL EXAM:    GENERAL: NAD, well-developed, AAOx3, looks stated age  HEENT:  Atraumatic, Normocephalic. EOMI, PERRLA, clear conjunctiva  PULMONARY: Clear to auscultation bilaterally; No wheeze  CARDIOVASCULAR: Regular rate and rhythm; No murmurs, rubs, or gallops  GASTROINTESTINAL: Soft, Nontender, Nondistended; Bowel sounds present  MUSCULOSKELETAL: warm, well perfused, no edema  NEUROLOGY:   CNII -XII intact, EOMI, PERRLA, no nystagmus,   Upper and lower extremity strength: 5/5, sensation intact b/l   No dysmetria  Able to stand with eyes closed and arms out without falling  + dizziness on position change  SKIN: No rashes or lesions      ADMISSION SUMMARY  79 y/o F PMHx DLD presenting with episode of dizziness.    # Dizziness: ddx includes vertigo vs. BBPH vs. r/o stroke  -worsened with positional changes  -improved with meclizine  -CTH with likely age related changes  - NIHSS 0  - MRI brain, MRA head and neck (neck with ADELINA) ordered (may need to consider transfer to tele if stroke noted on MRI)  -c/w meclizine reglan PRN   -neuro follow up  - spoke with nursing staff, recommend mattress alarm, but patient does agree she needs help walking to the bathroom     # EtOH use  -pt notes only on weekends but per ED notes family says daily intake  -alcohol level <10  -folate and B12 pending  -c/w thiamine and folate for now    # HLD   -c/w lipitor    # Urinary Incontinence   -oxybutynin in place of vesicare    # DVT ppx  -lovenox    # Diet  -low cholesterol    # Activity  -ambulate with assistance    # Code Status  -Full Code    # Dispo  -from home, d/c when medically stable HERACLIO ACOSTA 78y Female  MRN#: [id number]         SUBJECTIVE  Patient is a 78y old Female who presents with a chief complaint of Dizziness (2019 08:26)  Currently admitted to medicine with the primary diagnosis of Dizziness    Today is hospital day 1d, no acute events overnight. Reporting feeling dizzy when walking to the bathroom last night.Continues to have dizziness when changing positions this morning (i.e. switching from laying to down to sitting, and from sitting to standing). Reports associated nausea with dizziness.  No changes in vision, review of systems otherwise negative.     OBJECTIVE  Home Medications:  Lipitor:  (2019 12:09)  VESIcare:  (2019 12:09)    MEDICATIONS:  STANDING MEDICATIONS  atorvastatin 40 milliGRAM(s) Oral at bedtime  chlorhexidine 4% Liquid 1 Application(s) Topical <User Schedule>  enoxaparin Injectable 40 milliGRAM(s) SubCutaneous every 24 hours  folic acid 1 milliGRAM(s) Oral daily  oxybutynin 5 milliGRAM(s) Oral two times a day  thiamine 100 milliGRAM(s) Oral daily    PRN MEDICATIONS  acetaminophen   Tablet .. 650 milliGRAM(s) Oral every 6 hours PRN  meclizine 25 milliGRAM(s) Oral every 8 hours PRN  metoclopramide 10 milliGRAM(s) Oral three times a day PRN      VITAL SIGNS: Last 24 Hours  T(C): 36.2 (2019 05:19), Max: 37.1 (2019 21:00)  T(F): 97.1 (2019 05:19), Max: 98.7 (2019 21:00)  HR: 65 (2019 05:19) (65 - 71)  BP: 132/67 (2019 05:19) (132/67 - 143/70)  BP(mean): --  RR: 18 (2019 05:19) (18 - 18)  SpO2: 98% (2019 23:52) (98% - 98%)    LABS:                        15.5   8.02  )-----------( 172      ( 2019 12:08 )             47.1     04-03    141  |  101  |  10  ----------------------------<  83  4.6   |  26  |  0.8    Ca    9.9      2019 08:30    TPro  7.7  /  Alb  4.8  /  TBili  0.6  /  DBili  x   /  AST  29  /  ALT  29  /  AlkPhos  93  04-02    LIVER FUNCTIONS - ( 2019 12:08 )  Alb: 4.8 g/dL / Pro: 7.7 g/dL / ALK PHOS: 93 U/L / ALT: 29 U/L / AST: 29 U/L / GGT: x             Urinalysis Basic - ( 2019 12:08 )    Color: Yellow / Appearance: Clear / S.010 / pH: x  Gluc: x / Ketone: Negative  / Bili: Negative / Urobili: 0.2 mg/dL   Blood: x / Protein: Trace mg/dL / Nitrite: Negative   Leuk Esterase: Trace / RBC: x / WBC 3-5 /HPF   Sq Epi: x / Non Sq Epi: Occasional /HPF / Bacteria: x            CARDIAC MARKERS ( 2019 12:08 )  x     / <0.01 ng/mL / x     / x     / x          RADIOLOGY:  < from: CT Head No Cont (19 @ 14:38) >  IMPRESSION:    1.  Cerebral and cerebellar atrophy.    2.  Periventricular white matter hypodensities, nonspecific, differential   diagnostic possibilities include ischemic change, gliosis or   demyelination.    PHYSICAL EXAM:    GENERAL: NAD, well-developed, AAOx3, looks stated age  HEENT:  Atraumatic, Normocephalic. EOMI, PERRLA, clear conjunctiva  PULMONARY: Clear to auscultation bilaterally; No wheeze  CARDIOVASCULAR: Regular rate and rhythm; No murmurs, rubs, or gallops  GASTROINTESTINAL: Soft, Nontender, Nondistended; Bowel sounds present  MUSCULOSKELETAL: warm, well perfused, no edema  NEUROLOGY:   CNII -XII intact, EOMI, PERRLA, no nystagmus,   Upper and lower extremity strength: 5/5, sensation intact b/l   No dysmetria  Able to stand with eyes closed and arms out without falling  + dizziness on position change  SKIN: No rashes or lesions      ADMISSION SUMMARY  77 y/o F PMHx DLD presenting with episode of dizziness.    # Dizziness: ddx includes vertigo vs. BBPH vs. r/o stroke  -worsened with positional changes  -improved with meclizine  -CTH with likely age related changes  - NIHSS 0  - MRI brain, MRA head and neck (neck with ADELINA) ordered (may need to consider transfer to tele if stroke noted on MRI)  -c/w meclizine, reglan PRN   -neuro follow up  - spoke with nursing staff, recommend mattress alarm, but patient does agree she needs help walking to the bathroom     # EtOH use  -pt notes only on weekends but per ED notes family says daily intake  -alcohol level <10  -folate and B12 pending  -c/w thiamine and folate for now    #Thiamine Deficiency  #Folate deficiency  Cw thiamine and folate supplement    # HLD   -c/w lipitor    # Urinary Incontinence   -oxybutynin in place of vesicare    # DVT ppx  -lovenox    # Diet  -low cholesterol    # Activity  -ambulate with assistance    # Code Status  -Full Code    # Dispo  -from home, d/c when medically stable

## 2019-04-04 ENCOUNTER — TRANSCRIPTION ENCOUNTER (OUTPATIENT)
Age: 79
End: 2019-04-04

## 2019-04-04 VITALS — OXYGEN SATURATION: 95 %

## 2019-04-04 RX ORDER — MECLIZINE HCL 12.5 MG
1 TABLET ORAL
Qty: 90 | Refills: 0
Start: 2019-04-04 | End: 2019-05-03

## 2019-04-04 RX ORDER — METOCLOPRAMIDE HCL 10 MG
1 TABLET ORAL
Qty: 42 | Refills: 0
Start: 2019-04-04 | End: 2019-04-17

## 2019-04-04 RX ADMIN — Medication 5 MILLIGRAM(S): at 05:22

## 2019-04-04 RX ADMIN — Medication 1 MILLIGRAM(S): at 11:32

## 2019-04-04 RX ADMIN — Medication 100 MILLIGRAM(S): at 11:32

## 2019-04-04 NOTE — DISCHARGE NOTE NURSING/CASE MANAGEMENT/SOCIAL WORK - NSDCDPATPORTLINK_GEN_ALL_CORE
You can access the [facility] [place] [facility], offered by [facility], by registering with the following website: [email or web address]

## 2019-04-04 NOTE — DISCHARGE NOTE PROVIDER - CARE PROVIDER_API CALL
Darinel Schofield)  Geriatric Medicine; Internal Medicine  [address] [place] [address]  Phone: [phone]  Fax: [phone]  Follow Up Time:

## 2019-04-04 NOTE — PROGRESS NOTE ADULT - SUBJECTIVE AND OBJECTIVE BOX
04-04-19 @ 12:31    HERACLIO ACOSTA  78y  Female  Seen walking briskly on the floor. No c/o. Feels great.    INTERVAL EVENTS: none    MEDICATIONS  (STANDING):  atorvastatin 40 milliGRAM(s) Oral at bedtime  chlorhexidine 4% Liquid 1 Application(s) Topical <User Schedule>  enoxaparin Injectable 40 milliGRAM(s) SubCutaneous every 24 hours  folic acid 1 milliGRAM(s) Oral daily  LORazepam   Injectable 0.5 milliGRAM(s) IV Push once  oxybutynin 5 milliGRAM(s) Oral two times a day  thiamine 100 milliGRAM(s) Oral daily    MEDICATIONS  (PRN):  acetaminophen   Tablet .. 650 milliGRAM(s) Oral every 6 hours PRN Mild Pain (1 - 3)  meclizine 25 milliGRAM(s) Oral every 8 hours PRN Dizziness  metoclopramide 10 milliGRAM(s) Oral three times a day PRN nausea      T(C): 36.1 (04-04-19 @ 04:47), Max: 37 (04-03-19 @ 13:14)  HR: 59 (04-04-19 @ 04:47) (59 - 80)  BP: 147/73 (04-04-19 @ 04:47) (105/56 - 147/73)  RR: 18 (04-04-19 @ 04:47) (17 - 18)  SpO2: 95% (04-04-19 @ 08:55) (95% - 98%)  Wt(kg): --Vital Signs Last 24 Hrs  T(C): 36.1 (04 Apr 2019 04:47), Max: 37 (03 Apr 2019 13:14)  T(F): 97 (04 Apr 2019 04:47), Max: 98.6 (03 Apr 2019 13:14)  HR: 59 (04 Apr 2019 04:47) (59 - 80)  BP: 147/73 (04 Apr 2019 04:47) (105/56 - 147/73)  BP(mean): --  RR: 18 (04 Apr 2019 04:47) (17 - 18)  SpO2: 95% (04 Apr 2019 08:55) (95% - 98%)    PHYSICAL EXAM:  GENERAL: very healthy look. NAD  NECK: supple.   Moist mucosa  CHEST/LUNG: Clear.   HEART: S1S2 normal   ABDOMEN: benign.   EXTREMITIES: no CCE  No neuro deficit.       04-03    141  |  101  |  10  ----------------------------<  83  4.6   |  26  |  0.8    Ca    9.9      03 Apr 2019 08:30              RADIOLOGY & ADDITIONAL TESTS:  MRI ANGIO :   Findings:    The right common, internal and external carotid arteries are patent.    The left common, internal and external carotid arteries are patent.    The vertebral arteries are patent.    IMPRESSION:     No evidence of major vascular stenosis.          ZHENG SINGH M.D., ATTENDING RADIOLOGIST  This document has been electronically signed. Apr  3 2019  4:38PM    MR HEAD :   FINDINGS:    The ventricles and cortical sulci are compatible with a mild degree of   cerebral volume loss.    There is patchy and confluent T2/FLAIR signal hyperintensity within the   cerebral hemispheric white matter consistent with chronic microvascular   change.    There is no evidence of acute intracranial hemorrhage, extra-axial fluid   collection or midline shift.       There is no diffusion abnormality tosuggest acute/subacute infarct.   There is normal flow void present within the major vascular structures.      The orbits, sinuses, and mastoid complexes are unremarkable.     IMPRESSION:     1.  No evidence of recent infarct or acute intracranial hemorrhage.    2.  Mild-moderate chronic microvascular changes.        ZHENG SINGH M.D., ATTENDING RADIOLOGIST  This document has been electronically signed. Apr  3 2019  4:39PM          ASSESSMENT / PLAN  :      VERTIGO ; She is doing better. on meclizine. Tx-ed for presumed OM. neuro w/u -ve.   URINARY INCONTINENCE : To cont Rx, stable.   HYPERLIPIDEMIA : takes statin.     Discussed w/ Dr. White. She will be discharged  today. will f/u at OP.

## 2019-04-04 NOTE — DISCHARGE NOTE PROVIDER - NSDCCPCAREPLAN_GEN_ALL_CORE_FT
PRINCIPAL DISCHARGE DIAGNOSIS  Diagnosis: Vertigo  Assessment and Plan of Treatment: Symptoms improved with meclizine. Work up was negative for stroke or stenosis. Continue to take meclizine as needed for dizziness.      SECONDARY DISCHARGE DIAGNOSES  Diagnosis: History of hyperlipidemia  Assessment and Plan of Treatment: Continue taking home medications

## 2019-04-09 DIAGNOSIS — H54.415A BLINDNESS RIGHT EYE CATEGORY 5, NORMAL VISION LEFT EYE: ICD-10-CM

## 2019-04-09 DIAGNOSIS — R42 DIZZINESS AND GIDDINESS: ICD-10-CM

## 2019-04-09 DIAGNOSIS — E78.5 HYPERLIPIDEMIA, UNSPECIFIED: ICD-10-CM

## 2019-04-09 DIAGNOSIS — E51.9 THIAMINE DEFICIENCY, UNSPECIFIED: ICD-10-CM

## 2019-04-09 DIAGNOSIS — R32 UNSPECIFIED URINARY INCONTINENCE: ICD-10-CM

## 2019-04-09 DIAGNOSIS — Z72.89 OTHER PROBLEMS RELATED TO LIFESTYLE: ICD-10-CM

## 2021-04-29 ENCOUNTER — INPATIENT (INPATIENT)
Facility: HOSPITAL | Age: 81
LOS: 0 days | Discharge: HOME | End: 2021-04-30
Attending: STUDENT IN AN ORGANIZED HEALTH CARE EDUCATION/TRAINING PROGRAM | Admitting: STUDENT IN AN ORGANIZED HEALTH CARE EDUCATION/TRAINING PROGRAM
Payer: MEDICARE

## 2021-04-29 VITALS
OXYGEN SATURATION: 97 % | TEMPERATURE: 99 F | SYSTOLIC BLOOD PRESSURE: 124 MMHG | RESPIRATION RATE: 20 BRPM | DIASTOLIC BLOOD PRESSURE: 83 MMHG | WEIGHT: 130.07 LBS | HEIGHT: 65 IN | HEART RATE: 91 BPM

## 2021-04-29 DIAGNOSIS — Z98.49 CATARACT EXTRACTION STATUS, UNSPECIFIED EYE: Chronic | ICD-10-CM

## 2021-04-29 LAB
ALBUMIN SERPL ELPH-MCNC: 4.7 G/DL — SIGNIFICANT CHANGE UP (ref 3.5–5.2)
ALP SERPL-CCNC: 92 U/L — SIGNIFICANT CHANGE UP (ref 30–115)
ALT FLD-CCNC: 26 U/L — SIGNIFICANT CHANGE UP (ref 0–41)
ANION GAP SERPL CALC-SCNC: 13 MMOL/L — SIGNIFICANT CHANGE UP (ref 7–14)
APPEARANCE UR: ABNORMAL
AST SERPL-CCNC: 29 U/L — SIGNIFICANT CHANGE UP (ref 0–41)
BACTERIA # UR AUTO: ABNORMAL
BASOPHILS # BLD AUTO: 0.04 K/UL — SIGNIFICANT CHANGE UP (ref 0–0.2)
BASOPHILS NFR BLD AUTO: 0.3 % — SIGNIFICANT CHANGE UP (ref 0–1)
BILIRUB SERPL-MCNC: 0.4 MG/DL — SIGNIFICANT CHANGE UP (ref 0.2–1.2)
BILIRUB UR-MCNC: NEGATIVE — SIGNIFICANT CHANGE UP
BUN SERPL-MCNC: 21 MG/DL — HIGH (ref 10–20)
CALCIUM SERPL-MCNC: 9.6 MG/DL — SIGNIFICANT CHANGE UP (ref 8.5–10.1)
CHLORIDE SERPL-SCNC: 100 MMOL/L — SIGNIFICANT CHANGE UP (ref 98–110)
CO2 SERPL-SCNC: 29 MMOL/L — SIGNIFICANT CHANGE UP (ref 17–32)
COLOR SPEC: YELLOW — SIGNIFICANT CHANGE UP
COMMENT - URINE: SIGNIFICANT CHANGE UP
COMMENT - URINE: SIGNIFICANT CHANGE UP
CREAT SERPL-MCNC: 0.8 MG/DL — SIGNIFICANT CHANGE UP (ref 0.7–1.5)
DIFF PNL FLD: NEGATIVE — SIGNIFICANT CHANGE UP
EOSINOPHIL # BLD AUTO: 0 K/UL — SIGNIFICANT CHANGE UP (ref 0–0.7)
EOSINOPHIL NFR BLD AUTO: 0 % — SIGNIFICANT CHANGE UP (ref 0–8)
EPI CELLS # UR: 2 /HPF — SIGNIFICANT CHANGE UP (ref 0–5)
GLUCOSE SERPL-MCNC: 126 MG/DL — HIGH (ref 70–99)
GLUCOSE UR QL: NEGATIVE — SIGNIFICANT CHANGE UP
HCT VFR BLD CALC: 44.8 % — SIGNIFICANT CHANGE UP (ref 37–47)
HGB BLD-MCNC: 15.1 G/DL — SIGNIFICANT CHANGE UP (ref 12–16)
HYALINE CASTS # UR AUTO: 4 /LPF — SIGNIFICANT CHANGE UP (ref 0–7)
IMM GRANULOCYTES NFR BLD AUTO: 1.1 % — HIGH (ref 0.1–0.3)
KETONES UR-MCNC: NEGATIVE — SIGNIFICANT CHANGE UP
LACTATE SERPL-SCNC: 1.4 MMOL/L — SIGNIFICANT CHANGE UP (ref 0.7–2)
LEUKOCYTE ESTERASE UR-ACNC: NEGATIVE — SIGNIFICANT CHANGE UP
LIDOCAIN IGE QN: 35 U/L — SIGNIFICANT CHANGE UP (ref 7–60)
LYMPHOCYTES # BLD AUTO: 0.62 K/UL — LOW (ref 1.2–3.4)
LYMPHOCYTES # BLD AUTO: 4.5 % — LOW (ref 20.5–51.1)
MCHC RBC-ENTMCNC: 31.1 PG — HIGH (ref 27–31)
MCHC RBC-ENTMCNC: 33.7 G/DL — SIGNIFICANT CHANGE UP (ref 32–37)
MCV RBC AUTO: 92.4 FL — SIGNIFICANT CHANGE UP (ref 81–99)
MONOCYTES # BLD AUTO: 0.46 K/UL — SIGNIFICANT CHANGE UP (ref 0.1–0.6)
MONOCYTES NFR BLD AUTO: 3.3 % — SIGNIFICANT CHANGE UP (ref 1.7–9.3)
NEUTROPHILS # BLD AUTO: 12.58 K/UL — HIGH (ref 1.4–6.5)
NEUTROPHILS NFR BLD AUTO: 90.8 % — HIGH (ref 42.2–75.2)
NITRITE UR-MCNC: NEGATIVE — SIGNIFICANT CHANGE UP
NRBC # BLD: 0 /100 WBCS — SIGNIFICANT CHANGE UP (ref 0–0)
PH UR: 7.5 — SIGNIFICANT CHANGE UP (ref 5–8)
PLATELET # BLD AUTO: 221 K/UL — SIGNIFICANT CHANGE UP (ref 130–400)
POTASSIUM SERPL-MCNC: 4.3 MMOL/L — SIGNIFICANT CHANGE UP (ref 3.5–5)
POTASSIUM SERPL-SCNC: 4.3 MMOL/L — SIGNIFICANT CHANGE UP (ref 3.5–5)
PROT SERPL-MCNC: 7.1 G/DL — SIGNIFICANT CHANGE UP (ref 6–8)
PROT UR-MCNC: SIGNIFICANT CHANGE UP
RBC # BLD: 4.85 M/UL — SIGNIFICANT CHANGE UP (ref 4.2–5.4)
RBC # FLD: 13 % — SIGNIFICANT CHANGE UP (ref 11.5–14.5)
RBC CASTS # UR COMP ASSIST: 7 /HPF — HIGH (ref 0–4)
SARS-COV-2 RNA SPEC QL NAA+PROBE: SIGNIFICANT CHANGE UP
SODIUM SERPL-SCNC: 142 MMOL/L — SIGNIFICANT CHANGE UP (ref 135–146)
SP GR SPEC: 1.01 — SIGNIFICANT CHANGE UP (ref 1.01–1.03)
UROBILINOGEN FLD QL: SIGNIFICANT CHANGE UP
WBC # BLD: 13.85 K/UL — HIGH (ref 4.8–10.8)
WBC # FLD AUTO: 13.85 K/UL — HIGH (ref 4.8–10.8)
WBC UR QL: 7 /HPF — HIGH (ref 0–5)

## 2021-04-29 PROCEDURE — 74177 CT ABD & PELVIS W/CONTRAST: CPT | Mod: 26,MA

## 2021-04-29 PROCEDURE — 99285 EMERGENCY DEPT VISIT HI MDM: CPT

## 2021-04-29 RX ORDER — SODIUM CHLORIDE 9 MG/ML
1000 INJECTION, SOLUTION INTRAVENOUS ONCE
Refills: 0 | Status: COMPLETED | OUTPATIENT
Start: 2021-04-29 | End: 2021-04-29

## 2021-04-29 RX ORDER — METRONIDAZOLE 500 MG
500 TABLET ORAL ONCE
Refills: 0 | Status: COMPLETED | OUTPATIENT
Start: 2021-04-29 | End: 2021-04-29

## 2021-04-29 RX ORDER — MORPHINE SULFATE 50 MG/1
4 CAPSULE, EXTENDED RELEASE ORAL ONCE
Refills: 0 | Status: DISCONTINUED | OUTPATIENT
Start: 2021-04-29 | End: 2021-04-29

## 2021-04-29 RX ORDER — ONDANSETRON 8 MG/1
4 TABLET, FILM COATED ORAL ONCE
Refills: 0 | Status: COMPLETED | OUTPATIENT
Start: 2021-04-29 | End: 2021-04-29

## 2021-04-29 RX ORDER — CIPROFLOXACIN LACTATE 400MG/40ML
400 VIAL (ML) INTRAVENOUS ONCE
Refills: 0 | Status: COMPLETED | OUTPATIENT
Start: 2021-04-29 | End: 2021-04-29

## 2021-04-29 RX ADMIN — MORPHINE SULFATE 4 MILLIGRAM(S): 50 CAPSULE, EXTENDED RELEASE ORAL at 17:00

## 2021-04-29 RX ADMIN — ONDANSETRON 4 MILLIGRAM(S): 8 TABLET, FILM COATED ORAL at 17:02

## 2021-04-29 RX ADMIN — SODIUM CHLORIDE 1000 MILLILITER(S): 9 INJECTION, SOLUTION INTRAVENOUS at 17:02

## 2021-04-29 RX ADMIN — Medication 200 MILLIGRAM(S): at 19:48

## 2021-04-29 RX ADMIN — MORPHINE SULFATE 4 MILLIGRAM(S): 50 CAPSULE, EXTENDED RELEASE ORAL at 19:48

## 2021-04-29 RX ADMIN — Medication 100 MILLIGRAM(S): at 19:48

## 2021-04-29 NOTE — ED PROVIDER NOTE - OBJECTIVE STATEMENT
80 year old female w hx of HTN, HLD, urinary incontinence, diverticulitis presents to the ED for evaluation of gradual onset constant b/l lower abdominal pain x 1 day. Pt thought she may be constipation, took pepto bismol and milk of magnesia which resulted in a large, nonbloody BM, however pain has persisted. She also notes nausea. Denies fevers/chills, chest pain, sob, v/d, obstipation, back/flank pain, irritative voiding symptoms, black/bloody stools, recent travel/sick contacts/antibiotics, abnl vaginal bleeding/discharge.     Last colonoscopy 5 years ago which showed hemorrhoids, o/w normal.

## 2021-04-29 NOTE — ED PROVIDER NOTE - CLINICAL SUMMARY MEDICAL DECISION MAKING FREE TEXT BOX
79 yo female PMH HLD, HTN, overactive bladder, diverticulitis c/o lower abdominal pain since this morning,  Non-radiating, not associated with fever, chills, vomiting, bloody stools ( her stools have been dark lately, but she has been taking Pepto-bismol), no urinary complaints, or any other problems.   Well-appearing elderly female in  NAD, head AT/NC, PERRL, pink conjunctivae,  mmm, nml oropharynx, nml phonation without drooling or trismus, supple neck without midline spine ttp, nml work of breathing, lungs CTA b/l, equal air entry, RRR, well-perfused extremities, distal pulses intact, abdomen soft, ND, + lower abdominal ttp, BS present in all quadrants, no midline spine or CVA ttp, no leg edema or unilateral calf swelling, A&Ox3, no focal neuro deficits, nml mood and affect.  Analgesia given twice, CT scan shows colitis, patient lives alone, prefers to stay in the hospital due to pain.  Abx given,  admit.  Son also want the patient to be admitted.

## 2021-04-29 NOTE — ED PROVIDER NOTE - PROGRESS NOTE DETAILS
RICH: patient resting comfortably at this time, reports improvement in her pain. discussed lab results, ua still pending. transport here to take patient to ct. Received sign out by [facility]. pt still in abd pain - lives home by herself. will admit

## 2021-04-29 NOTE — ED PROVIDER NOTE - PHYSICAL EXAMINATION
VITALS:  I have reviewed the initial vital signs.  GENERAL: Well-developed, well-nourished elderly female, in no acute distress. Nontoxic.  HEENT: Sclera clear. No conjunctival pallor. EOMI, PERRLA. Mucous membranes dry.  NECK: supple w FROM.   CARDIO: RRR, nl S1 and S2. No murmurs, rubs, or gallops.  PULM: Normal effort. CTA b/l without wheezes, rales, or rhonchi.  MSK: Normal, steady gait.   GI: Normal bowel sounds. Abdomen soft and non-distended. +b/l lower abd ttp, L>R, no rebound or guarding. No pulsatile masses.  : No CVA tenderness b/l.  SKIN: Warm, dry. No pallor. No rash. No jaundice.   NEURO: A&Ox3. Speech clear. No focal deficits.  PSYCH: Calm and cooperative.

## 2021-04-29 NOTE — ED PROVIDER NOTE - ATTENDING CONTRIBUTION TO CARE
81 yo female PMH HLD, HTN, overactive bladder, diverticulitis c/o lower abdominal pain since this morning,  Non-radiating, not associated with fever, chills, vomiting, bloody stools ( her stools have been dark lately, but she has been taking Pepto-bismol), no urinary complaints, or any other problems.   Well-appearing elderly female in  NAD, head AT/NC, PERRL, pink conjunctivae,  mmm, nml oropharynx, nml phonation without drooling or trismus, supple neck without midline spine ttp, nml work of breathing, lungs CTA b/l, equal air entry, RRR, well-perfused extremities, distal pulses intact, abdomen soft, ND, + lower abdominal ttp, BS present in all quadrants, no midline spine or CVA ttp, no leg edema or unilateral calf swelling, A&Ox3, no focal neuro deficits, nml mood and affect.  Analgesia, CT scan, labs, reassess.

## 2021-04-30 ENCOUNTER — TRANSCRIPTION ENCOUNTER (OUTPATIENT)
Age: 81
End: 2021-04-30

## 2021-04-30 VITALS — HEART RATE: 74 BPM | OXYGEN SATURATION: 95 %

## 2021-04-30 PROBLEM — E78.00 PURE HYPERCHOLESTEROLEMIA, UNSPECIFIED: Chronic | Status: ACTIVE | Noted: 2019-04-02

## 2021-04-30 PROBLEM — R32 UNSPECIFIED URINARY INCONTINENCE: Chronic | Status: ACTIVE | Noted: 2019-04-02

## 2021-04-30 LAB
ALBUMIN SERPL ELPH-MCNC: 4.2 G/DL — SIGNIFICANT CHANGE UP (ref 3.5–5.2)
ALP SERPL-CCNC: 85 U/L — SIGNIFICANT CHANGE UP (ref 30–115)
ALT FLD-CCNC: 19 U/L — SIGNIFICANT CHANGE UP (ref 0–41)
ANION GAP SERPL CALC-SCNC: 7 MMOL/L — SIGNIFICANT CHANGE UP (ref 7–14)
AST SERPL-CCNC: 22 U/L — SIGNIFICANT CHANGE UP (ref 0–41)
BASOPHILS # BLD AUTO: 0.04 K/UL — SIGNIFICANT CHANGE UP (ref 0–0.2)
BASOPHILS NFR BLD AUTO: 0.4 % — SIGNIFICANT CHANGE UP (ref 0–1)
BILIRUB SERPL-MCNC: 0.6 MG/DL — SIGNIFICANT CHANGE UP (ref 0.2–1.2)
BUN SERPL-MCNC: 14 MG/DL — SIGNIFICANT CHANGE UP (ref 10–20)
CALCIUM SERPL-MCNC: 9 MG/DL — SIGNIFICANT CHANGE UP (ref 8.5–10.1)
CHLORIDE SERPL-SCNC: 101 MMOL/L — SIGNIFICANT CHANGE UP (ref 98–110)
CO2 SERPL-SCNC: 31 MMOL/L — SIGNIFICANT CHANGE UP (ref 17–32)
COVID-19 SPIKE DOMAIN AB INTERP: NEGATIVE — SIGNIFICANT CHANGE UP
COVID-19 SPIKE DOMAIN ANTIBODY RESULT: 0.4 U/ML — SIGNIFICANT CHANGE UP
CREAT SERPL-MCNC: 0.7 MG/DL — SIGNIFICANT CHANGE UP (ref 0.7–1.5)
EOSINOPHIL # BLD AUTO: 0.03 K/UL — SIGNIFICANT CHANGE UP (ref 0–0.7)
EOSINOPHIL NFR BLD AUTO: 0.3 % — SIGNIFICANT CHANGE UP (ref 0–8)
GLUCOSE SERPL-MCNC: 93 MG/DL — SIGNIFICANT CHANGE UP (ref 70–99)
HCT VFR BLD CALC: 42.3 % — SIGNIFICANT CHANGE UP (ref 37–47)
HGB BLD-MCNC: 13.8 G/DL — SIGNIFICANT CHANGE UP (ref 12–16)
IMM GRANULOCYTES NFR BLD AUTO: 0.3 % — SIGNIFICANT CHANGE UP (ref 0.1–0.3)
LYMPHOCYTES # BLD AUTO: 1.25 K/UL — SIGNIFICANT CHANGE UP (ref 1.2–3.4)
LYMPHOCYTES # BLD AUTO: 13.2 % — LOW (ref 20.5–51.1)
MAGNESIUM SERPL-MCNC: 2.5 MG/DL — HIGH (ref 1.8–2.4)
MCHC RBC-ENTMCNC: 30.9 PG — SIGNIFICANT CHANGE UP (ref 27–31)
MCHC RBC-ENTMCNC: 32.6 G/DL — SIGNIFICANT CHANGE UP (ref 32–37)
MCV RBC AUTO: 94.8 FL — SIGNIFICANT CHANGE UP (ref 81–99)
MONOCYTES # BLD AUTO: 0.77 K/UL — HIGH (ref 0.1–0.6)
MONOCYTES NFR BLD AUTO: 8.1 % — SIGNIFICANT CHANGE UP (ref 1.7–9.3)
NEUTROPHILS # BLD AUTO: 7.38 K/UL — HIGH (ref 1.4–6.5)
NEUTROPHILS NFR BLD AUTO: 77.7 % — HIGH (ref 42.2–75.2)
NRBC # BLD: 0 /100 WBCS — SIGNIFICANT CHANGE UP (ref 0–0)
PLATELET # BLD AUTO: 191 K/UL — SIGNIFICANT CHANGE UP (ref 130–400)
POTASSIUM SERPL-MCNC: 3.9 MMOL/L — SIGNIFICANT CHANGE UP (ref 3.5–5)
POTASSIUM SERPL-SCNC: 3.9 MMOL/L — SIGNIFICANT CHANGE UP (ref 3.5–5)
PROT SERPL-MCNC: 6.4 G/DL — SIGNIFICANT CHANGE UP (ref 6–8)
RBC # BLD: 4.46 M/UL — SIGNIFICANT CHANGE UP (ref 4.2–5.4)
RBC # FLD: 13.2 % — SIGNIFICANT CHANGE UP (ref 11.5–14.5)
SARS-COV-2 IGG+IGM SERPL QL IA: 0.4 U/ML — SIGNIFICANT CHANGE UP
SARS-COV-2 IGG+IGM SERPL QL IA: NEGATIVE — SIGNIFICANT CHANGE UP
SODIUM SERPL-SCNC: 139 MMOL/L — SIGNIFICANT CHANGE UP (ref 135–146)
WBC # BLD: 9.5 K/UL — SIGNIFICANT CHANGE UP (ref 4.8–10.8)
WBC # FLD AUTO: 9.5 K/UL — SIGNIFICANT CHANGE UP (ref 4.8–10.8)

## 2021-04-30 PROCEDURE — 99222 1ST HOSP IP/OBS MODERATE 55: CPT

## 2021-04-30 PROCEDURE — 99238 HOSP IP/OBS DSCHRG MGMT 30/<: CPT

## 2021-04-30 PROCEDURE — 99223 1ST HOSP IP/OBS HIGH 75: CPT

## 2021-04-30 RX ORDER — OXYBUTYNIN CHLORIDE 5 MG
10 TABLET ORAL DAILY
Refills: 0 | Status: DISCONTINUED | OUTPATIENT
Start: 2021-04-30 | End: 2021-04-30

## 2021-04-30 RX ORDER — MORPHINE SULFATE 50 MG/1
2 CAPSULE, EXTENDED RELEASE ORAL EVERY 6 HOURS
Refills: 0 | Status: DISCONTINUED | OUTPATIENT
Start: 2021-04-30 | End: 2021-04-30

## 2021-04-30 RX ORDER — PANTOPRAZOLE SODIUM 20 MG/1
40 TABLET, DELAYED RELEASE ORAL
Refills: 0 | Status: DISCONTINUED | OUTPATIENT
Start: 2021-04-30 | End: 2021-04-30

## 2021-04-30 RX ORDER — CIPROFLOXACIN LACTATE 400MG/40ML
500 VIAL (ML) INTRAVENOUS EVERY 12 HOURS
Refills: 0 | Status: DISCONTINUED | OUTPATIENT
Start: 2021-04-30 | End: 2021-04-30

## 2021-04-30 RX ORDER — ENOXAPARIN SODIUM 100 MG/ML
40 INJECTION SUBCUTANEOUS DAILY
Refills: 0 | Status: DISCONTINUED | OUTPATIENT
Start: 2021-04-30 | End: 2021-04-30

## 2021-04-30 RX ORDER — PANTOPRAZOLE SODIUM 20 MG/1
1 TABLET, DELAYED RELEASE ORAL
Qty: 30 | Refills: 0
Start: 2021-04-30 | End: 2021-05-29

## 2021-04-30 RX ORDER — METRONIDAZOLE 500 MG
500 TABLET ORAL EVERY 8 HOURS
Refills: 0 | Status: DISCONTINUED | OUTPATIENT
Start: 2021-04-30 | End: 2021-04-30

## 2021-04-30 RX ORDER — ACETAMINOPHEN 500 MG
650 TABLET ORAL EVERY 6 HOURS
Refills: 0 | Status: DISCONTINUED | OUTPATIENT
Start: 2021-04-30 | End: 2021-04-30

## 2021-04-30 RX ORDER — CHLORHEXIDINE GLUCONATE 213 G/1000ML
1 SOLUTION TOPICAL
Refills: 0 | Status: DISCONTINUED | OUTPATIENT
Start: 2021-04-30 | End: 2021-04-30

## 2021-04-30 RX ORDER — ATORVASTATIN CALCIUM 80 MG/1
40 TABLET, FILM COATED ORAL AT BEDTIME
Refills: 0 | Status: DISCONTINUED | OUTPATIENT
Start: 2021-04-30 | End: 2021-04-30

## 2021-04-30 RX ORDER — SODIUM CHLORIDE 9 MG/ML
1000 INJECTION, SOLUTION INTRAVENOUS
Refills: 0 | Status: DISCONTINUED | OUTPATIENT
Start: 2021-04-30 | End: 2021-04-30

## 2021-04-30 RX ORDER — METRONIDAZOLE 500 MG
1 TABLET ORAL
Qty: 27 | Refills: 0
Start: 2021-04-30 | End: 2021-05-08

## 2021-04-30 RX ORDER — CIPROFLOXACIN LACTATE 400MG/40ML
1 VIAL (ML) INTRAVENOUS
Qty: 18 | Refills: 0
Start: 2021-04-30 | End: 2021-05-08

## 2021-04-30 RX ORDER — AMLODIPINE BESYLATE 2.5 MG/1
1 TABLET ORAL
Qty: 0 | Refills: 0 | DISCHARGE

## 2021-04-30 RX ADMIN — Medication 500 MILLIGRAM(S): at 11:31

## 2021-04-30 RX ADMIN — Medication 500 MILLIGRAM(S): at 06:06

## 2021-04-30 RX ADMIN — PANTOPRAZOLE SODIUM 40 MILLIGRAM(S): 20 TABLET, DELAYED RELEASE ORAL at 06:06

## 2021-04-30 RX ADMIN — CHLORHEXIDINE GLUCONATE 1 APPLICATION(S): 213 SOLUTION TOPICAL at 06:05

## 2021-04-30 RX ADMIN — SODIUM CHLORIDE 75 MILLILITER(S): 9 INJECTION, SOLUTION INTRAVENOUS at 12:08

## 2021-04-30 RX ADMIN — Medication 650 MILLIGRAM(S): at 03:30

## 2021-04-30 RX ADMIN — Medication 10 MILLIGRAM(S): at 11:31

## 2021-04-30 RX ADMIN — Medication 650 MILLIGRAM(S): at 04:06

## 2021-04-30 NOTE — DISCHARGE NOTE PROVIDER - NSFOLLOWUPCLINICS_GEN_ALL_ED_FT
[facility]  Family Medicine  [address] [place] [address]  Phone: [phone]  Fax:   Follow Up Time: 1 week

## 2021-04-30 NOTE — DISCHARGE NOTE PROVIDER - NSDCMRMEDTOKEN_GEN_ALL_CORE_FT
amLODIPine 5 mg oral tablet: 1 tab(s) orally once a day  Lipitor:   meclizine 25 mg oral tablet: 1 tab(s) orally every 8 hours, As needed, Dizziness  metoclopramide 10 mg oral tablet: 1 tab(s) orally 3 times a day, As needed, nausea  VESIcare:    amLODIPine 5 mg oral tablet: 1 tab(s) orally once a day  ciprofloxacin 500 mg oral tablet: 1 tab(s) orally every 12 hours  Lipitor:   metroNIDAZOLE 500 mg oral tablet: 1 tab(s) orally every 8 hours  pantoprazole 40 mg oral delayed release tablet: 1 tab(s) orally once a day (before a meal)  VESIcare:

## 2021-04-30 NOTE — CONSULT NOTE ADULT - SUBJECTIVE AND OBJECTIVE BOX
Gastroenterology Consultation:    Patient is a 80y old  Female who presents with a chief complaint of     Admitted on: 04-29-21  HPI:  80 year old female w PMH/o  HTN, HLD, urinary incontinence, diverticulosis? presents to the hospital for 1 day h/o non-radiating, constant b/l lower abdominal pain. Pt reports the pain began as a mild pain and worsened over the course of the day. Pt also reports experiencing nausea w/o vomiting, chills and diaphoresis. Pt took milk of magnesia and pepto-bismol w/ no relief of sxs. Pt reports having several formed bowel movements after taking milk of magnesia. Pt denies fever, bloody bowel movements, unexplained weight loss, diarrhea chest pain, SOB. Pt recalls having a colonoscopy done approx 5 yrs ago w/ a finding of diverticulosis/ hemorrhoids and a bout of diverticulitis in the past although she is uncertain. Pt relatively poor historian.    In the ED, vitals stable. WC 13.8, labs otherwise unremarkable. CT abdomen pelvis w/ IV contrast: Findings consistent with acute colitis involving distal descending colon; no abscess. (30 Apr 2021 00:04)      Prior records Reviewed (Y/N): Y  History obtained from person other than patient (Y/N): Y        PAST MEDICAL & SURGICAL HISTORY:  Incontinence  urinary    High cholesterol    S/P cataract surgery  Bilateral        FAMILY HISTORY:  No pertinent family history in first degree relatives        Social History:  Alcohol: denies   Drugs: denies     Home Medications:  amLODIPine 5 mg oral tablet: 1 tab(s) orally once a day (30 Apr 2021 02:23)  Lipitor:  (02 Apr 2019 12:09)  VESIcare:  (02 Apr 2019 12:09)    MEDICATIONS  (STANDING):  atorvastatin 40 milliGRAM(s) Oral at bedtime  chlorhexidine 4% Liquid 1 Application(s) Topical <User Schedule>  ciprofloxacin   Oral Tab/Cap - Peds 500 milliGRAM(s) Oral every 12 hours  enoxaparin Injectable 40 milliGRAM(s) SubCutaneous daily  lactated ringers. 1000 milliLiter(s) (75 mL/Hr) IV Continuous <Continuous>  metroNIDAZOLE    Tablet 500 milliGRAM(s) Oral every 8 hours  oxybutynin 10 milliGRAM(s) Oral daily  pantoprazole    Tablet 40 milliGRAM(s) Oral before breakfast    MEDICATIONS  (PRN):  acetaminophen   Tablet .. 650 milliGRAM(s) Oral every 6 hours PRN Mild Pain (1 - 3)  morphine  - Injectable 2 milliGRAM(s) IV Push every 6 hours PRN Severe Pain (7 - 10)      Allergies  No Known Allergies      Review of Systems:   Constitutional:  No Fever, No Chills  ENT/Mouth:  No Hearing Changes,  No Difficulty Swallowing  Eyes:  No Eye Pain, No Vision Changes  Cardiovascular:  No Chest Pain, No Palpitations  Respiratory:  No Cough, No Dyspnea  Gastrointestinal:  As described in HPI  Musculoskeletal:  No Joint Swelling, No Back Pain  Skin:  No Skin Lesions, No Jaundice  Neuro:  No Syncope, No Dizziness  Heme/Lymph:  No Bruising, No Bleeding.          Physical Examination:  T(C): 36.2 (04-30-21 @ 05:23), Max: 37.3 (04-29-21 @ 16:21)  HR: 74 (04-30-21 @ 06:11) (71 - 971)  BP: 103/57 (04-30-21 @ 05:23) (103/57 - 132/70)  RR: 18 (04-30-21 @ 05:23) (18 - 20)  SpO2: 95% (04-30-21 @ 06:11) (95% - 98%)  Height (cm): 165.1 (04-29-21 @ 16:21)  Weight (kg): 59 (04-29-21 @ 16:21)      Constitutional: No acute distress.  Eyes:. Conjunctivae are clear, Sclera is non-icteric.  Ears Nose and Throat: The external ears are normal appearing,  Oral mucosa is pink and moist.  Respiratory:  No signs of respiratory distress. Lung sounds are clear bilaterally.  Cardiovascular:  S1 S2, Regular rate and rhythm.  GI: Abdomen is soft, symmetric, and non-tender without distention.  Bowel sounds are present and normoactive in all four quadrants. No masses, hepatomegaly, or splenomegaly are noted.   Neuro: No Tremor, No involuntary movements  Skin: No rashes, No Jaundice.          Data: (reviewed by attending)                        15.1   13.85 )-----------( 221      ( 29 Apr 2021 17:10 )             44.8     Hgb Trend:  15.1  04-29-21 @ 17:10      04-29    142  |  100  |  21<H>  ----------------------------<  126<H>  4.3   |  29  |  0.8    Ca    9.6      29 Apr 2021 17:10    TPro  7.1  /  Alb  4.7  /  TBili  0.4  /  DBili  x   /  AST  29  /  ALT  26  /  AlkPhos  92  04-29    Liver panel trend:  TBili 0.4   /   AST 29   /   ALT 26   /   AlkP 92   /   Tptn 7.1   /   Alb 4.7    /   DBili --      04-29              Radiology:(reviewed by attending)  CT Abdomen and Pelvis w/ IV Cont:   EXAM:  CT ABDOMEN AND PELVIS IC            PROCEDURE DATE:  04/29/2021            INTERPRETATION:  CLINICAL STATEMENT: Abdominal pain.    TECHNIQUE: Contiguous axial CT images were obtained from the lower chest to the pubic symphysis following administration of 100cc Optiray 320 intravenous contrast.  Oral contrast was not administered.  Reformatted images in the coronal and sagittal planes were acquired.    Comparison: None.    FINDINGS:    LOWER CHEST: Dependent atelectasis.    HEPATOBILIARY:Unremarkable.    SPLEEN: Unremarkable.    PANCREAS: Unremarkable.    ADRENAL GLANDS: Unremarkable.    KIDNEYS: No hydronephrosis or renal calculus. Symmetric renal enhancement. Exophytic left renal cysts. Duplicated right renal collecting system.    ABDOMINOPELVIC NODES: Unremarkable.    PELVIC ORGANS: Unremarkable.    PERITONEUM/MESENTERY/BOWEL: Short segment of distal descending colon with circumferential wall thickening, mild pericolonic inflammatory stranding. No abscess or gross free air. Colonic diverticulosis. No bowel obstruction. No ascites.    BONES/SOFT TISSUES: Age-indeterminate L3, L1, T12 vertebral body compression fractures; likely chronic.    OTHER: Left fat-containing inguinal hernia. Scattered vascular calcifications.      IMPRESSION:    1. Findings consistent with acute colitis involving distal descending colon; no abscess.    2. Age-indeterminate L3, L1, T12 vertebral body compression fractures; likely chronic.              WILMA MENDEZ MD; Attending Radiologist  This document has been electronically signed. Apr 29 2021  7:20PM (04-29-21 @ 18:41)

## 2021-04-30 NOTE — H&P ADULT - ASSESSMENT
80 year old female w PMH/o  HTN, HLD, urinary incontinence, diverticulosis? presents to the hospital for 1 day h/o non-radiating, constant b/l lower abdominal pain    # Acute Colitis, Likely Uncomplicated Diverticulitis  - Non septic on admission  - WC 13.8  - CT A/P: Short segment of distal descending colon with circumferential wall thickening, mild pericolonic inflammatory stranding, colonic diverticulosis   80 year old female w PMH/o  HTN, HLD, urinary incontinence, diverticulosis? presents to the hospital for 1 day h/o non-radiating, constant b/l lower abdominal pain    # Acute Colitis, Likely Uncomplicated Diverticulitis  - Non septic on admission  - WC 13.8  - CT A/P: Short segment of distal descending colon with circumferential wall thickening, mild pericolonic inflammatory stranding, colonic diverticulosis  - C/w IV cipro and flagyl for now  - C/w IVF  - Monitor WBC  - GI consult, will likely need OP GI f/u for colonoscopy  - NPO for now w/ plan to advance diet once pt improves clinically  - serial abd exams      #HTN/ controlled  - blood pressure w/i nml on inpatient readings  - will hold as patient seemingly volume depleted, look to restart if hypertensive  - pt does not know current home meds or pharmacy, [facility] records: Amlodipine 5mg  QDaily filled last 5/2020      #Overflow incontinence  #Asymtpomatic bacteruria  - No indication to treat asymptomatic bacteruria although will receive Cipro  - denies urinary sxs  - pt does not know current home meds or pharmacy, [facility] records: Oxybutynin ER 12.5mg QDaily filled last 2020    #DLD  - pt does not know current home meds or pharmacy, [facility] records: Atorvastatin 40mg QDaily filled last 2020      #DVT PPx- Lovenox 40mg QD  #GI PPx- Protonix  #Diet- NPO  #CHG  #Activity- IAT  #Dispo- Acute, From [facility]  #Code- Full       80 year old female w PMH/o  HTN, HLD, urinary incontinence, diverticulosis? presents to the hospital for 1 day h/o non-radiating, constant b/l lower abdominal pain    # Acute Colitis, Likely Uncomplicated Diverticulitis  - Non septic on admission  - WC 13.8 on admission, afebrile  - CT A/P: Short segment of distal descending colon with circumferential wall thickening, mild pericolonic inflammatory stranding, colonic diverticulosis  - C/w IV cipro and flagyl for now, switch to PO on d/c  - C/w IVF  - Monitor WBC  - GI consult, will likely need OP GI f/u for colonoscopy  - NPO for now w/ plan to advance diet once pt improves clinically  - serial abd exams      #HTN/ controlled  - blood pressure w/i nml on inpatient readings  - will hold as patient seemingly volume depleted, look to restart if hypertensive  - pt does not know current home meds or pharmacy, [facility]: Amlodipine 5mg  QDaily filled last 5/2020      #Overflow incontinence  #Asymtpomatic bacteruria  - No indication to treat asymptomatic bacteruria although will receive Cipro  - denies urinary sxs  - pt does not know current home meds or pharmacy, [facility]: Oxybutynin ER 12.5mg QDaily filled last 2020    #DLD  - pt does not know current home meds or pharmacy, [facility]: Atorvastatin 40mg QDaily filled last 2020    #H/o BPPV  - no sxs of dizziness  - meclizine prn      #DVT PPx- Lovenox 40mg QD  #GI PPx- Protonix  #Diet- NPO  #CHG  #Activity- IAT  #Dispo- Acute, From [facility]  #Code- Full

## 2021-04-30 NOTE — H&P ADULT - NSHPLABSRESULTS_GEN_ALL_CORE
15.1   13.85 )-----------( 221      ( 2021 17:10 )             44.8           142  |  100  |  21<H>  ----------------------------<  126<H>  4.3   |  29  |  0.8    Ca    9.6      2021 17:10    TPro  7.1  /  Alb  4.7  /  TBili  0.4  /  DBili  x   /  AST  29  /  ALT  26  /  AlkPhos  92                Urinalysis Basic - ( 2021 18:33 )    Color: Yellow / Appearance: Turbid / S.012 / pH: x  Gluc: x / Ketone: Negative  / Bili: Negative / Urobili: <2 mg/dL   Blood: x / Protein: Trace / Nitrite: Negative   Leuk Esterase: Negative / RBC: 7 /HPF / WBC 7 /HPF   Sq Epi: x / Non Sq Epi: 2 /HPF / Bacteria: Many            Lactate Trend   @ 17:10 Lactate:1.4             CAPILLARY BLOOD GLUCOSE

## 2021-04-30 NOTE — H&P ADULT - HISTORY OF PRESENT ILLNESS
80 year old female w PMH/o  HTN, HLD, urinary incontinence, diverticulosis? presents to the hospital for 1 day h/o non-radiating, constant b/l lower abdominal pain. Pt reports the pain began as a mild pain and worsened over the course of the day. Pt also reports experiencing nausea w/o vomiting, chills and diaphoresis. Pt took milk of magnesia and pepto-bismol w/ no relief of sxs. Pt reports having several formed bowel movements after taking milk of magnesia. Pt denies fever, bloody bowel movements, unexplained weight loss, diarrhea chest pain, SOB. Pt recalls having a colonoscopy done approx 5 yrs ago w/ a finding of diverticulosis and a bout of diverticulitis in the past although she is uncertain.    In the ED, vitals stable. WC 13.8, labs otherwise unremarkable. CT abdomen pelvis w/ IV contrast: Findings consistent with acute colitis involving distal descending colon; no abscess. 80 year old female w PMH/o  HTN, HLD, urinary incontinence, diverticulosis? presents to the hospital for 1 day h/o non-radiating, constant b/l lower abdominal pain. Pt reports the pain began as a mild pain and worsened over the course of the day. Pt also reports experiencing nausea w/o vomiting, chills and diaphoresis. Pt took milk of magnesia and pepto-bismol w/ no relief of sxs. Pt reports having several formed bowel movements after taking milk of magnesia. Pt denies fever, bloody bowel movements, unexplained weight loss, diarrhea chest pain, SOB. Pt recalls having a colonoscopy done approx 5 yrs ago w/ a finding of diverticulosis and a bout of diverticulitis in the past although she is uncertain. Pt relatively poor historian.    In the ED, vitals stable. WC 13.8, labs otherwise unremarkable. CT abdomen pelvis w/ IV contrast: Findings consistent with acute colitis involving distal descending colon; no abscess. 80 year old female w PMH/o  HTN, HLD, urinary incontinence, diverticulosis? presents to the hospital for 1 day h/o non-radiating, constant b/l lower abdominal pain. Pt reports the pain began as a mild pain and worsened over the course of the day. Pt also reports experiencing nausea w/o vomiting, chills and diaphoresis. Pt took milk of magnesia and pepto-bismol w/ no relief of sxs. Pt reports having several formed bowel movements after taking milk of magnesia. Pt denies fever, bloody bowel movements, unexplained weight loss, diarrhea chest pain, SOB. Pt recalls having a colonoscopy done approx 5 yrs ago w/ a finding of diverticulosis/ hemorrhoids and a bout of diverticulitis in the past although she is uncertain. Pt relatively poor historian.    In the ED, vitals stable. WC 13.8, labs otherwise unremarkable. CT abdomen pelvis w/ IV contrast: Findings consistent with acute colitis involving distal descending colon; no abscess.

## 2021-04-30 NOTE — H&P ADULT - NSHPPHYSICALEXAM_GEN_ALL_CORE
PHYSICAL EXAM:  GENERAL: NAD, appears chronological age  HEAD:  Atraumatic, Normocephalic  EYES: EOMI, PERRLA, anicteric sclera  NECK: Supple, No JVD  CHEST/LUNG: Clear to auscultation bilaterally; No wheeze; No crackles  HEART: Regular rate and rhythm; No murmurs;  ABDOMEN: +TTP to B/L LQ, Soft, Nondistended; Bowel sounds present; No guarding  EXTREMITIES:  2+ Peripheral Pulses, No cyanosis or edema  PSYCH: AAOx3  NEUROLOGY: non-focal  SKIN: No rashes or lesions

## 2021-04-30 NOTE — PROGRESS NOTE ADULT - SUBJECTIVE AND OBJECTIVE BOX
Medicine Progress Note    SUBJECTIVE / OVERNIGHT EVENTS:  Patient states she feels "surprisingly good" now and is asking if she can go home later today. States she hasn't had any further diarrhea, though she hasn't ate anything yet either. Still reports some left sided abdominal cramping. Denies any nausea or vomiting this morning. Seen by GI this morning.    ADDITIONAL REVIEW OF SYSTEMS:  GEN: afebrile, no chills  CV: Denies chest pain, palpitations, or LE edema  RESP: Denies denies dyspnea, cough, or hemoptysis  GI: as above  : Denies hematuria or dysuria    MEDICATIONS  (STANDING):  atorvastatin 40 milliGRAM(s) Oral at bedtime  chlorhexidine 4% Liquid 1 Application(s) Topical <User Schedule>  ciprofloxacin   Oral Tab/Cap - Peds 500 milliGRAM(s) Oral every 12 hours  enoxaparin Injectable 40 milliGRAM(s) SubCutaneous daily  lactated ringers. 1000 milliLiter(s) (75 mL/Hr) IV Continuous <Continuous>  metroNIDAZOLE    Tablet 500 milliGRAM(s) Oral every 8 hours  oxybutynin 10 milliGRAM(s) Oral daily  pantoprazole    Tablet 40 milliGRAM(s) Oral before breakfast    MEDICATIONS  (PRN):  acetaminophen   Tablet .. 650 milliGRAM(s) Oral every 6 hours PRN Mild Pain (1 - 3)  morphine  - Injectable 2 milliGRAM(s) IV Push every 6 hours PRN Severe Pain (7 - 10)    PHYSICAL EXAM:  Vital Signs Last 24 Hrs  T(C): 36.2 (2021 05:23), Max: 37.3 (2021 16:21)  T(F): 97.1 (2021 05:23), Max: 99.1 (2021 16:21)  HR: 74 (2021 06:11) (71 - 971)  BP: 103/57 (2021 05:23) (103/57 - 132/70)  BP(mean): --  RR: 18 (2021 05:23) (18 - 20)  SpO2: 95% (2021 06:11) (95% - 98%)  CONSTITUTIONAL: NAD, well-developed, well-groomed  ENMT: no pharyngeal injection or exudates  RESPIRATORY: Normal respiratory effort; lungs are clear to auscultation bilaterally  CARDIOVASCULAR: Regular rate and rhythm, normal S1 and S2, no murmur/rub/gallop; No lower extremity edema; Peripheral pulses are 2+ bilaterally  ABDOMEN: Nontender to palpation, normoactive bowel sounds, no rebound/guarding; No hepatosplenomegaly  PSYCH: A+O to person, place, and time; affect appropriate  NEUROLOGY: CN 2-12 are intact and symmetric; no gross acute focal motor or sensory deficits   SKIN: No rashes; no palpable lesions    LABS:                        13.8   9.50  )-----------( 191      ( 2021 04:30 )             42.3     04-30    139  |  101  |  14  ----------------------------<  93  3.9   |  31  |  0.7    Ca    9.0      2021 04:30  Mg     2.5     04-30    TPro  6.4  /  Alb  4.2  /  TBili  0.6  /  DBili  x   /  AST  22  /  ALT  19  /  AlkPhos  85  04-30      Urinalysis Basic - ( 2021 18:33 )    Color: Yellow / Appearance: Turbid / S.012 / pH: x  Gluc: x / Ketone: Negative  / Bili: Negative / Urobili: <2 mg/dL   Blood: x / Protein: Trace / Nitrite: Negative   Leuk Esterase: Negative / RBC: 7 /HPF / WBC 7 /HPF   Sq Epi: x / Non Sq Epi: 2 /HPF / Bacteria: Many        COVID-19 PCR: NotDetec (2021 20:33)      RADIOLOGY & ADDITIONAL TESTS:  Imaging from Last 24 Hours:    Electrocardiogram/QTc Interval:    COORDINATION OF CARE:  Care Discussed with Consultants/Other Providers:

## 2021-04-30 NOTE — DISCHARGE NOTE PROVIDER - HOSPITAL COURSE
Patient is a 80 year old female with a PMHx of HTN, dyslipidemia, urinary incontinence and  diverticulosis who presented to the hospital with abdominal pain and nausea/vomiting/diarrhea. She was found to have a WBC of 13.8 on admission with a CT scan of the abdomen showing acute colitis of the distal descending colon:    - Acute distal descending colitis:  -colitis could be infectious or inflammatory  -symptoms have already improved. Advanced to full liquid diet this morning. Stool studies have been ordered (Calprotectin, C.Diff PCR, GI PCR panel, lactoferrin). However patient hasn't had further diarrhea/BM thus far. Obtain studies if patient has a BM. If however she tolerates diet with no further symptoms can look to advance diet and possibly discharge this PM with oral Cipro/Flagyl and outpatient GI follow-up.  -is on oral Cipro/Flagyl, LR @ 75 mL per hour at this time.  -continue Protonix  -WBC has normalized from 13.8 to 9.5 this morning.    - Dyslipidemia:  -continue statin

## 2021-04-30 NOTE — CONSULT NOTE ADULT - ASSESSMENT
80 year old female w PMH/o  HTN, HLD, urinary incontinence, diverticulosis  presents to the hospital for 1 day h/o non-radiating, constant b/l lower abdominal pain with nausea and vomiting  . CT abdomen showed descending colitis.      #descending colitis : infectious versus inflammatory   No fever   leucocytosis [id number]    REC:  Lactose free diet   Ciprofloxacin and Flagyl if no contraindication   check C. DIFF , GI PCR , fecal calprotectin , lactoferrin , and ESR   patient will need repeat colonoscopy as outpatient

## 2021-04-30 NOTE — DISCHARGE NOTE PROVIDER - NSDCCPCAREPLAN_GEN_ALL_CORE_FT
PRINCIPAL DISCHARGE DIAGNOSIS  Diagnosis: Colitis  Assessment and Plan of Treatment: Cont antibiotics as ordered       PRINCIPAL DISCHARGE DIAGNOSIS  Diagnosis: Colitis  Assessment and Plan of Treatment: Cont antibiotics as ordered      SECONDARY DISCHARGE DIAGNOSES  Diagnosis: HTN (hypertension)  Assessment and Plan of Treatment: Low salt diet  Activity as tolerated.  Take all medication as prescribed.  Follow up with your medical doctor for routine blood pressure monitoring at your next visit.  Notify your doctor if you have any of the following symptoms:   Dizziness, Lightheadedness, Blurry vision, Headache, Chest pain, Shortness of breath

## 2021-04-30 NOTE — DISCHARGE NOTE NURSING/CASE MANAGEMENT/SOCIAL WORK - PATIENT PORTAL LINK FT
You can access the FollowMyHealth Patient Portal offered by [facility] by registering at the following website: [email or web address]. By joining [facility]’s FollowMyHealth portal, you will also be able to view your health information using other applications (apps) compatible with our system.

## 2021-04-30 NOTE — PROGRESS NOTE ADULT - ASSESSMENT
Patient is a 80 year old female with a PMHx of HTN, dyslipidemia, urinary incontinence and  diverticulosis who presented to the hospital with abdominal pain and nausea/vomiting/diarrhea. She was found to have a WBC of 13.8 on admission with a CT scan of the abdomen showing acute colitis of the distal descending colon:    1) Acute distal descending colitis:  -patient seen by GI  -colitis could be infectious or inflammatory  -symptoms have already improved. Advanced to full liquid diet this morning. Stool studies have been ordered (Calprotectin, C.Diff PCR, GI PCR panel, lactoferrin). However patient hasn't had further diarrhea/BM thus far. Obtain studies if patient has a BM. If however she tolerates diet with no further symptoms can look to advance diet and possibly discharge this PM with oral Cipro/Flagyl and outpatient GI follow-up.  -is on oral Cipro/Flagyl, LR @ 75 mL per hour at this time.  -continue Protonix  -WBC has normalized from 13.8 to 9.5 this morning.    2) Dyslipidemia:  -continue statin    3) DVT prophylaxis:  -on SC Lovenox    4) Disposition:  -to home, likely no needs once diet advanced and tolerated.

## 2021-04-30 NOTE — H&P ADULT - ATTENDING COMMENTS
HPI:  80 year old woman with a PMHx of HTN, HLD, urinary incontinence, diverticulosis? admitted for acute colitis. She reports a 1 day history of non-radiating, constant b/l lower abdominal pain associated with diarrhea and nausea.  She reports taking Pepto-Bismol and milk of magnesia with slight transient improvement in her symptoms. She denied any fevers, CP, SOB, diaphoresis, LOC, dizziness, hematemesis, or hematochezia. She did report an episode of dark stool but reported it was after the took a significant amount of pepto-bismol.   CT a/p showed colitis in the descending colon.   UA was found to be positive but she denied dysuria and foul smelling urine.     REVIEW OF SYSTEMS:  CONSTITUTIONAL:  No weakness, fevers, chills, night sweats, weight loss  EYES/ENT: No visual changes. No vertigo or dysphagia  NECK: No neck pain or stiffness  RESPIRATORY: No cough, wheezing, hemoptysis. No shortness of breath  CARDIOVASCULAR: No chest pain or palpitations. No lower extremity edema  GASTROINTESTINAL: +  abdominal pain. +nausea, no vomiting, + diarrhea, no hematemesis  GENITOURINARY: No dysuria or hematuria   NEUROLOGICAL: No focal numbness or weakness  SKIN: No rashes or itching  HEMATOLOGIC: No easy bruising or prolonged bleeding.      PHYSICAL EXAM:  GENERAL: NAD, well-developed, Non-toxic, stated age   HEAD:  Atraumatic, Normocephalic  EYES: EOMI, Sclera White   NECK: Supple, No JVD  CHEST/LUNG: Clear to auscultation bilaterally; No wheezing, rhonchi, or crackles  HEART: Regular rate and rhythm; s1, s2, No murmurs, rubs, or gallops  ABDOMEN: Soft, LLQ ttp, Nondistended; Bowel sounds present, No rebound or guarding noted   EXTREMITIES:  No lower extremity edema or calf tenderness to palpation.  No clubbing or cyanosis  PSYCH: AAOx3, pleasant, cooperative, not anxious  NEUROLOGY: non-focal  SKIN: No rashes or lesions      ASSESSMENT AND PLAN:  Colitis/Diverticulitis: SIRS not present on admission, though  leukocytosis noted.   -Cont with cipro and flagyl. Advance diet as tolerated.   -Follow up blood cultures.   -She will likely need out patient GI follow up for colonoscopy in 6-8 weeks after resolution of symptoms     Asymptomatic bacteruria: already on cipro for colitis. Follow up urine cultures.     Hyperglycemia: check hba1c    HTN / HLD: cont home mediations     urinary incontinence: cont with oxybutinin.     DVT ppx: Lovenox/Heparin  GI ppx: Not indicated  GOC: Full code.      My note supersedes the residents in the event of a discrepancy.

## 2021-05-01 LAB
CULTURE RESULTS: SIGNIFICANT CHANGE UP
SPECIMEN SOURCE: SIGNIFICANT CHANGE UP

## 2021-05-05 DIAGNOSIS — R73.9 HYPERGLYCEMIA, UNSPECIFIED: ICD-10-CM

## 2021-05-05 DIAGNOSIS — K52.9 NONINFECTIVE GASTROENTERITIS AND COLITIS, UNSPECIFIED: ICD-10-CM

## 2021-05-05 DIAGNOSIS — K57.92 DIVERTICULITIS OF INTESTINE, PART UNSPECIFIED, WITHOUT PERFORATION OR ABSCESS WITHOUT BLEEDING: ICD-10-CM

## 2021-05-05 DIAGNOSIS — A09 INFECTIOUS GASTROENTERITIS AND COLITIS, UNSPECIFIED: ICD-10-CM

## 2021-05-05 DIAGNOSIS — R82.71 BACTERIURIA: ICD-10-CM

## 2021-05-05 DIAGNOSIS — N39.490 OVERFLOW INCONTINENCE: ICD-10-CM

## 2021-05-05 DIAGNOSIS — I10 ESSENTIAL (PRIMARY) HYPERTENSION: ICD-10-CM

## 2021-05-05 DIAGNOSIS — D72.829 ELEVATED WHITE BLOOD CELL COUNT, UNSPECIFIED: ICD-10-CM

## 2021-05-05 DIAGNOSIS — E78.5 HYPERLIPIDEMIA, UNSPECIFIED: ICD-10-CM

## 2022-06-22 NOTE — PATIENT PROFILE ADULT - NSPROALCOHOLUSE2_GEN_A_NUR
----- Message from Radha Gore sent at 6/22/2022 10:13 AM EDT -----  Regarding: RX Refills  Good Morning  I am Completely out of meds and I didn’t know I needed refills. I use [facility] for my prescriptions. I believe they sent you refill requests if someone could please review those. I made an appointment for July 6th for RX refills with Dr Liriano but I need some medicine in the meantime.     Thank you    yes

## 2023-03-31 ENCOUNTER — EMERGENCY (EMERGENCY)
Facility: HOSPITAL | Age: 83
LOS: 1 days | Discharge: HOME | End: 2023-03-31
Attending: EMERGENCY MEDICINE
Payer: MEDICARE

## 2023-03-31 VITALS
DIASTOLIC BLOOD PRESSURE: 107 MMHG | WEIGHT: 130.95 LBS | TEMPERATURE: 99 F | HEART RATE: 87 BPM | OXYGEN SATURATION: 96 % | SYSTOLIC BLOOD PRESSURE: 141 MMHG | RESPIRATION RATE: 18 BRPM

## 2023-03-31 DIAGNOSIS — R10.9 UNSPECIFIED ABDOMINAL PAIN: ICD-10-CM

## 2023-03-31 DIAGNOSIS — Z98.49 CATARACT EXTRACTION STATUS, UNSPECIFIED EYE: Chronic | ICD-10-CM

## 2023-03-31 DIAGNOSIS — K57.90 DIVERTICULOSIS OF INTESTINE, PART UNSPECIFIED, WITHOUT PERFORATION OR ABSCESS WITHOUT BLEEDING: ICD-10-CM

## 2023-03-31 DIAGNOSIS — E78.5 HYPERLIPIDEMIA, UNSPECIFIED: ICD-10-CM

## 2023-03-31 DIAGNOSIS — K57.32 DIVERTICULITIS OF LARGE INTESTINE WITHOUT PERFORATION OR ABSCESS WITHOUT BLEEDING: ICD-10-CM

## 2023-03-31 DIAGNOSIS — K59.00 CONSTIPATION, UNSPECIFIED: ICD-10-CM

## 2023-03-31 DIAGNOSIS — R11.0 NAUSEA: ICD-10-CM

## 2023-03-31 DIAGNOSIS — I10 ESSENTIAL (PRIMARY) HYPERTENSION: ICD-10-CM

## 2023-03-31 LAB
ALBUMIN SERPL ELPH-MCNC: 4.3 G/DL — SIGNIFICANT CHANGE UP (ref 3.5–5.2)
ALP SERPL-CCNC: 91 U/L — SIGNIFICANT CHANGE UP (ref 30–115)
ALT FLD-CCNC: 20 U/L — SIGNIFICANT CHANGE UP (ref 0–41)
ANION GAP SERPL CALC-SCNC: 12 MMOL/L — SIGNIFICANT CHANGE UP (ref 7–14)
APPEARANCE UR: CLEAR — SIGNIFICANT CHANGE UP
AST SERPL-CCNC: 32 U/L — SIGNIFICANT CHANGE UP (ref 0–41)
BASOPHILS # BLD AUTO: 0.04 K/UL — SIGNIFICANT CHANGE UP (ref 0–0.2)
BASOPHILS NFR BLD AUTO: 0.4 % — SIGNIFICANT CHANGE UP (ref 0–1)
BILIRUB SERPL-MCNC: 0.5 MG/DL — SIGNIFICANT CHANGE UP (ref 0.2–1.2)
BILIRUB UR-MCNC: NEGATIVE — SIGNIFICANT CHANGE UP
BUN SERPL-MCNC: 12 MG/DL — SIGNIFICANT CHANGE UP (ref 10–20)
CALCIUM SERPL-MCNC: 9.6 MG/DL — SIGNIFICANT CHANGE UP (ref 8.4–10.4)
CHLORIDE SERPL-SCNC: 100 MMOL/L — SIGNIFICANT CHANGE UP (ref 98–110)
CO2 SERPL-SCNC: 27 MMOL/L — SIGNIFICANT CHANGE UP (ref 17–32)
COLOR SPEC: SIGNIFICANT CHANGE UP
CREAT SERPL-MCNC: 0.7 MG/DL — SIGNIFICANT CHANGE UP (ref 0.7–1.5)
DIFF PNL FLD: NEGATIVE — SIGNIFICANT CHANGE UP
EGFR: 87 ML/MIN/1.73M2 — SIGNIFICANT CHANGE UP
EOSINOPHIL # BLD AUTO: 0.07 K/UL — SIGNIFICANT CHANGE UP (ref 0–0.7)
EOSINOPHIL NFR BLD AUTO: 0.8 % — SIGNIFICANT CHANGE UP (ref 0–8)
GLUCOSE SERPL-MCNC: 95 MG/DL — SIGNIFICANT CHANGE UP (ref 70–99)
GLUCOSE UR QL: NEGATIVE — SIGNIFICANT CHANGE UP
HCT VFR BLD CALC: 42.2 % — SIGNIFICANT CHANGE UP (ref 37–47)
HGB BLD-MCNC: 14.1 G/DL — SIGNIFICANT CHANGE UP (ref 12–16)
IMM GRANULOCYTES NFR BLD AUTO: 0.3 % — SIGNIFICANT CHANGE UP (ref 0.1–0.3)
KETONES UR-MCNC: NEGATIVE — SIGNIFICANT CHANGE UP
LEUKOCYTE ESTERASE UR-ACNC: NEGATIVE — SIGNIFICANT CHANGE UP
LIDOCAIN IGE QN: 48 U/L — SIGNIFICANT CHANGE UP (ref 7–60)
LYMPHOCYTES # BLD AUTO: 1.22 K/UL — SIGNIFICANT CHANGE UP (ref 1.2–3.4)
LYMPHOCYTES # BLD AUTO: 13.5 % — LOW (ref 20.5–51.1)
MCHC RBC-ENTMCNC: 31.8 PG — HIGH (ref 27–31)
MCHC RBC-ENTMCNC: 33.4 G/DL — SIGNIFICANT CHANGE UP (ref 32–37)
MCV RBC AUTO: 95 FL — SIGNIFICANT CHANGE UP (ref 81–99)
MONOCYTES # BLD AUTO: 0.72 K/UL — HIGH (ref 0.1–0.6)
MONOCYTES NFR BLD AUTO: 8 % — SIGNIFICANT CHANGE UP (ref 1.7–9.3)
NEUTROPHILS # BLD AUTO: 6.97 K/UL — HIGH (ref 1.4–6.5)
NEUTROPHILS NFR BLD AUTO: 77 % — HIGH (ref 42.2–75.2)
NITRITE UR-MCNC: NEGATIVE — SIGNIFICANT CHANGE UP
NRBC # BLD: 0 /100 WBCS — SIGNIFICANT CHANGE UP (ref 0–0)
PH UR: 6.5 — SIGNIFICANT CHANGE UP (ref 5–8)
PLATELET # BLD AUTO: 201 K/UL — SIGNIFICANT CHANGE UP (ref 130–400)
POTASSIUM SERPL-MCNC: 4.9 MMOL/L — SIGNIFICANT CHANGE UP (ref 3.5–5)
POTASSIUM SERPL-SCNC: 4.9 MMOL/L — SIGNIFICANT CHANGE UP (ref 3.5–5)
PROT SERPL-MCNC: 7.3 G/DL — SIGNIFICANT CHANGE UP (ref 6–8)
PROT UR-MCNC: NEGATIVE — SIGNIFICANT CHANGE UP
RBC # BLD: 4.44 M/UL — SIGNIFICANT CHANGE UP (ref 4.2–5.4)
RBC # FLD: 12.8 % — SIGNIFICANT CHANGE UP (ref 11.5–14.5)
SODIUM SERPL-SCNC: 139 MMOL/L — SIGNIFICANT CHANGE UP (ref 135–146)
SP GR SPEC: 1.01 — LOW (ref 1.01–1.03)
UROBILINOGEN FLD QL: SIGNIFICANT CHANGE UP
WBC # BLD: 9.05 K/UL — SIGNIFICANT CHANGE UP (ref 4.8–10.8)
WBC # FLD AUTO: 9.05 K/UL — SIGNIFICANT CHANGE UP (ref 4.8–10.8)

## 2023-03-31 PROCEDURE — 81003 URINALYSIS AUTO W/O SCOPE: CPT

## 2023-03-31 PROCEDURE — 83690 ASSAY OF LIPASE: CPT

## 2023-03-31 PROCEDURE — 36415 COLL VENOUS BLD VENIPUNCTURE: CPT

## 2023-03-31 PROCEDURE — 87086 URINE CULTURE/COLONY COUNT: CPT

## 2023-03-31 PROCEDURE — 99284 EMERGENCY DEPT VISIT MOD MDM: CPT

## 2023-03-31 PROCEDURE — 74177 CT ABD & PELVIS W/CONTRAST: CPT | Mod: MA

## 2023-03-31 PROCEDURE — 99284 EMERGENCY DEPT VISIT MOD MDM: CPT | Mod: 25

## 2023-03-31 PROCEDURE — 74177 CT ABD & PELVIS W/CONTRAST: CPT | Mod: 26,MA

## 2023-03-31 PROCEDURE — 96374 THER/PROPH/DIAG INJ IV PUSH: CPT | Mod: XU

## 2023-03-31 PROCEDURE — 80053 COMPREHEN METABOLIC PANEL: CPT

## 2023-03-31 PROCEDURE — 85025 COMPLETE CBC W/AUTO DIFF WBC: CPT

## 2023-03-31 RX ORDER — FAMOTIDINE 10 MG/ML
20 INJECTION INTRAVENOUS ONCE
Refills: 0 | Status: COMPLETED | OUTPATIENT
Start: 2023-03-31 | End: 2023-03-31

## 2023-03-31 RX ORDER — METRONIDAZOLE 500 MG
1 TABLET ORAL
Qty: 21 | Refills: 0
Start: 2023-03-31 | End: 2023-04-06

## 2023-03-31 RX ORDER — CIPROFLOXACIN LACTATE 400MG/40ML
1 VIAL (ML) INTRAVENOUS
Qty: 14 | Refills: 0
Start: 2023-03-31 | End: 2023-04-06

## 2023-03-31 RX ORDER — SODIUM CHLORIDE 9 MG/ML
1850 INJECTION, SOLUTION INTRAVENOUS ONCE
Refills: 0 | Status: COMPLETED | OUTPATIENT
Start: 2023-03-31 | End: 2023-03-31

## 2023-03-31 RX ADMIN — FAMOTIDINE 20 MILLIGRAM(S): 10 INJECTION INTRAVENOUS at 13:22

## 2023-03-31 RX ADMIN — SODIUM CHLORIDE 1850 MILLILITER(S): 9 INJECTION, SOLUTION INTRAVENOUS at 13:22

## 2023-03-31 NOTE — ED ADULT TRIAGE NOTE - CHIEF COMPLAINT QUOTE
Patient coming in for gas, bloating, and stomach pain x1 month- Patient has been having diarrhea and constipation on and off. Patient has pmhx of hemorrhoids

## 2023-03-31 NOTE — ED PROVIDER NOTE - OBJECTIVE STATEMENT
81 yo female, PMHx of  HTN, dyslipidemia, urinary incontinence and  diverticulosis, constipation, presents with intermittent abdominal pain x1 month, associated with constipation and hemorrhoids, no alleviating factors, aggravated by eating, associated with nausea. Denies fevers, chills, vomiting, chest pain, shortness of breath, headache, dizziness. Follows with Dr. Leary. She had a CT 1 month ago when her symptoms first started but states it is worsening. She had an appointment for 4/11/23 with colorectal.

## 2023-03-31 NOTE — ED PROVIDER NOTE - NS ED ROS FT
GEN:  no fever, no chills, no generalized weakness  NEURO:  no headache, no dizziness  ENT: no sore throat, no runny nose  CV:  no chest pain, no palpitations  RESP:  no sob, no cough  GI:  +nausea, no vomiting, +abdominal pain, +constipation  :  no dysuria, no urinary frequency, no hematuria  MSK:  no joint pain, no edema  SKIN:  no rash, no bruising

## 2023-03-31 NOTE — ED PROVIDER NOTE - CARE PROVIDER_API CALL
Lucio Leary (DO; DO)  Gastroenterology  [address] [place] [address]  Phone: [phone]  Fax: [phone]  Follow Up Time: 1-3 Days

## 2023-03-31 NOTE — ED PROVIDER NOTE - PHYSICAL EXAMINATION
CONSTITUTIONAL: Well-developed; well-nourished; in no acute distress.   SKIN: warm, dry  HEAD: Normocephalic  CARD: S1, S2 normal; Regular rate and rhythm.   RESP: No wheezes, rales or rhonchi.  ABD: soft nd, +llq tenderness  EXT: Normal ROM.  No clubbing, cyanosis or edema.   NEURO: Alert, oriented, grossly unremarkable  PSYCH: Cooperative, appropriate.

## 2023-03-31 NOTE — ED PROVIDER NOTE - PATIENT PORTAL LINK FT
You can access the FollowMyHealth Patient Portal offered by [facility] by registering at the following website: [email or web address]. By joining [employer]’s FollowMyHealth portal, you will also be able to view your health information using other applications (apps) compatible with our system.

## 2023-03-31 NOTE — ED PROVIDER NOTE - CLINICAL SUMMARY MEDICAL DECISION MAKING FREE TEXT BOX
Patient with abdominal pain with acute sigmoid diverticulitis on exam unremarkable labs good candidate for p.o. antibiotics outpatient follow-up.

## 2023-04-02 LAB
CULTURE RESULTS: SIGNIFICANT CHANGE UP
SPECIMEN SOURCE: SIGNIFICANT CHANGE UP

## 2023-04-20 ENCOUNTER — APPOINTMENT (OUTPATIENT)
Dept: GASTROENTEROLOGY | Facility: CLINIC | Age: 83
End: 2023-04-20

## 2023-04-20 PROBLEM — Z00.00 ENCOUNTER FOR PREVENTIVE HEALTH EXAMINATION: Status: ACTIVE | Noted: 2023-04-20

## 2023-04-25 ENCOUNTER — EMERGENCY (EMERGENCY)
Facility: HOSPITAL | Age: 83
LOS: 0 days | Discharge: HOME | End: 2023-04-25
Attending: EMERGENCY MEDICINE
Payer: MEDICARE

## 2023-04-25 ENCOUNTER — OUTPATIENT (OUTPATIENT)
Dept: OUTPATIENT SERVICES | Facility: HOSPITAL | Age: 83
LOS: 1 days | End: 2023-04-25
Payer: COMMERCIAL

## 2023-04-25 VITALS
WEIGHT: 130.07 LBS | HEART RATE: 75 BPM | OXYGEN SATURATION: 98 % | TEMPERATURE: 98 F | DIASTOLIC BLOOD PRESSURE: 69 MMHG | SYSTOLIC BLOOD PRESSURE: 132 MMHG | HEIGHT: 65 IN | RESPIRATION RATE: 20 BRPM

## 2023-04-25 DIAGNOSIS — Z98.49 CATARACT EXTRACTION STATUS, UNSPECIFIED EYE: Chronic | ICD-10-CM

## 2023-04-25 DIAGNOSIS — K02.9 DENTAL CARIES, UNSPECIFIED: ICD-10-CM

## 2023-04-25 PROCEDURE — 99282 EMERGENCY DEPT VISIT SF MDM: CPT

## 2023-04-25 PROCEDURE — D0220: CPT

## 2023-04-25 PROCEDURE — D0140: CPT

## 2023-04-25 PROCEDURE — 99283 EMERGENCY DEPT VISIT LOW MDM: CPT

## 2023-04-25 PROCEDURE — D7140: CPT

## 2023-04-25 NOTE — ED PROVIDER NOTE - CLINICAL SUMMARY MEDICAL DECISION MAKING FREE TEXT BOX
Rectal pain.  No obvious evidence of thrombosed external hemorrhoids.  Patient with pink conjunctive a, not tachycardic normotensive, no evidence of bleeding currently.  No need for CBC at this time.  Belly is otherwise soft.  No indications for performing of cross-sectional imaging.    Patient requires a outpatient follow-up.  Reviewed ED referral ensured.  Patient's GI physician contacted.  Patient's records ED visit and prior GI note inpatient were reviewed.  Additional history was obtained from gastroenterology. Escalation to admission/observation was considered.  However patient feels much better and is comfortable with discharge.  Appropriate follow-up was arranged.

## 2023-04-25 NOTE — ED PROVIDER NOTE - PATIENT PORTAL LINK FT
You can access the FollowMyHealth Patient Portal offered by [facility] by registering at the following website: [email or web address]. By joining [employer]’s FollowMyHealth portal, you will also be able to view your health information using other applications (apps) compatible with our system.

## 2023-04-25 NOTE — ED ADULT TRIAGE NOTE - CHIEF COMPLAINT QUOTE
c/o bleeding hemorrhoids x 6 weeks, patient states she has been treating them at home herself, +rectal pain

## 2023-04-25 NOTE — ED PROVIDER NOTE - OBJECTIVE STATEMENT
82-year-old female history of high blood pressure, high cholesterol, history of diverticulitis diagnosed by colonoscopy.  Bout of diverticulitis 1 month ago treated with antibiotics, chronic history of hemorrhoids, now presents with continuous intermittent pain for related to hemorrhoids for 6 weeks specifically with defecation.  Also over the last couple days noted small specks of blood.  Not continuous.  No other symptoms.  No shortness of breath no fever no vomiting.  Ate this morning without difficulty.  Was told by her primary care doctor to utilize various ointments and stool softeners.  Her GI doctor recommended for her to see a colorectal surgeon.  Has not seen the specialist yet

## 2023-04-25 NOTE — ED PROVIDER NOTE - NSFOLLOWUPINSTRUCTIONS_ED_ALL_ED_FT
– Follow-up with colorectal surgeon as scheduled    – Follow-up with your gastroenterologist as scheduled    – Return to the emergency department if severe bleeding, severe abdominal pain, vomiting fever or any other concerns    – Continue using all your medications.      -Take Dulcolax pills every day once a day.  These are over-the-counter

## 2023-04-25 NOTE — ED PROVIDER NOTE - CARE PROVIDER_API CALL
Lucio Leary (DO; DO)  Gastroenterology  [address] [place] [address]  Phone: [phone]  Fax: [phone]  Follow Up Time: 4-6 Days

## 2023-04-26 DIAGNOSIS — K62.89 OTHER SPECIFIED DISEASES OF ANUS AND RECTUM: ICD-10-CM

## 2023-04-26 DIAGNOSIS — R21 RASH AND OTHER NONSPECIFIC SKIN ERUPTION: ICD-10-CM

## 2023-04-26 DIAGNOSIS — Z87.19 PERSONAL HISTORY OF OTHER DISEASES OF THE DIGESTIVE SYSTEM: ICD-10-CM

## 2023-04-26 DIAGNOSIS — Z86.69 PERSONAL HISTORY OF OTHER DISEASES OF THE NERVOUS SYSTEM AND SENSE ORGANS: ICD-10-CM

## 2023-04-26 DIAGNOSIS — Z86.79 PERSONAL HISTORY OF OTHER DISEASES OF THE CIRCULATORY SYSTEM: ICD-10-CM

## 2023-04-26 DIAGNOSIS — E78.00 PURE HYPERCHOLESTEROLEMIA, UNSPECIFIED: ICD-10-CM

## 2023-04-26 DIAGNOSIS — K05.6 PERIODONTAL DISEASE, UNSPECIFIED: ICD-10-CM

## 2023-05-24 ENCOUNTER — APPOINTMENT (OUTPATIENT)
Dept: SURGERY | Facility: CLINIC | Age: 83
End: 2023-05-24
Payer: MEDICARE

## 2023-05-24 ENCOUNTER — EMERGENCY (EMERGENCY)
Facility: HOSPITAL | Age: 83
LOS: 0 days | Discharge: HOME | End: 2023-05-24
Attending: EMERGENCY MEDICINE
Payer: MEDICARE

## 2023-05-24 VITALS
DIASTOLIC BLOOD PRESSURE: 79 MMHG | TEMPERATURE: 98 F | WEIGHT: 130.07 LBS | SYSTOLIC BLOOD PRESSURE: 160 MMHG | HEART RATE: 70 BPM | HEIGHT: 65 IN | RESPIRATION RATE: 18 BRPM | OXYGEN SATURATION: 95 %

## 2023-05-24 VITALS
TEMPERATURE: 97.2 F | BODY MASS INDEX: 22.2 KG/M2 | SYSTOLIC BLOOD PRESSURE: 120 MMHG | HEIGHT: 64 IN | OXYGEN SATURATION: 99 % | WEIGHT: 130 LBS | HEART RATE: 73 BPM | DIASTOLIC BLOOD PRESSURE: 90 MMHG

## 2023-05-24 DIAGNOSIS — E78.5 HYPERLIPIDEMIA, UNSPECIFIED: ICD-10-CM

## 2023-05-24 DIAGNOSIS — K64.2 THIRD DEGREE HEMORRHOIDS: ICD-10-CM

## 2023-05-24 DIAGNOSIS — M54.9 DORSALGIA, UNSPECIFIED: ICD-10-CM

## 2023-05-24 DIAGNOSIS — Z98.49 CATARACT EXTRACTION STATUS, UNSPECIFIED EYE: Chronic | ICD-10-CM
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
## 2023-05-24 DIAGNOSIS — M54.89 OTHER DORSALGIA: ICD-10-CM

## 2023-05-24 DIAGNOSIS — E78.00 PURE HYPERCHOLESTEROLEMIA, UNSPECIFIED: ICD-10-CM

## 2023-05-24 DIAGNOSIS — I10 ESSENTIAL (PRIMARY) HYPERTENSION: ICD-10-CM

## 2023-05-24 DIAGNOSIS — K51.90 ULCERATIVE COLITIS, UNSPECIFIED, W/OUT COMPLICATIONS: ICD-10-CM

## 2023-05-24 PROCEDURE — 99203 OFFICE O/P NEW LOW 30 MIN: CPT | Mod: 25

## 2023-05-24 PROCEDURE — 99283 EMERGENCY DEPT VISIT LOW MDM: CPT | Mod: 25

## 2023-05-24 PROCEDURE — 71101 X-RAY EXAM UNILAT RIBS/CHEST: CPT | Mod: RT

## 2023-05-24 PROCEDURE — 99284 EMERGENCY DEPT VISIT MOD MDM: CPT

## 2023-05-24 PROCEDURE — 46600 DIAGNOSTIC ANOSCOPY SPX: CPT

## 2023-05-24 PROCEDURE — 71101 X-RAY EXAM UNILAT RIBS/CHEST: CPT | Mod: 26,RT

## 2023-05-24 PROCEDURE — 96372 THER/PROPH/DIAG INJ SC/IM: CPT

## 2023-05-24 RX ORDER — METHOCARBAMOL 500 MG/1
1 TABLET, FILM COATED ORAL
Qty: 10 | Refills: 0
Start: 2023-05-24 | End: 2023-05-28

## 2023-05-24 RX ORDER — LIDOCAINE 4 G/100G
1 CREAM TOPICAL
Qty: 6 | Refills: 0
Start: 2023-05-24 | End: 2023-05-26

## 2023-05-24 RX ORDER — METHOCARBAMOL 500 MG/1
1500 TABLET, FILM COATED ORAL ONCE
Refills: 0 | Status: COMPLETED | OUTPATIENT
Start: 2023-05-24 | End: 2023-05-24

## 2023-05-24 RX ORDER — LIDOCAINE 4 G/100G
1 CREAM TOPICAL
Qty: 5 | Refills: 0
Start: 2023-05-24 | End: 2023-05-28

## 2023-05-24 RX ORDER — LIDOCAINE 4 G/100G
1 CREAM TOPICAL ONCE
Refills: 0 | Status: COMPLETED | OUTPATIENT
Start: 2023-05-24 | End: 2023-05-24

## 2023-05-24 RX ORDER — KETOROLAC TROMETHAMINE 30 MG/ML
30 SYRINGE (ML) INJECTION ONCE
Refills: 0 | Status: DISCONTINUED | OUTPATIENT
Start: 2023-05-24 | End: 2023-05-24

## 2023-05-24 RX ADMIN — Medication 30 MILLIGRAM(S): at 12:28

## 2023-05-24 RX ADMIN — LIDOCAINE 1 PATCH: 4 CREAM TOPICAL at 12:27

## 2023-05-24 RX ADMIN — Medication 30 MILLIGRAM(S): at 13:08

## 2023-05-24 RX ADMIN — METHOCARBAMOL 1500 MILLIGRAM(S): 500 TABLET, FILM COATED ORAL at 12:27

## 2023-05-24 NOTE — ED ADULT TRIAGE NOTE - BP NONINVASIVE DIASTOLIC (MM HG)
[de-identified] : X-rays are not available, but were done in December with and without the brace.  Mother informs me that the curve measures 25° in the brace had good correction.
81

## 2023-05-24 NOTE — ED PROVIDER NOTE - OBJECTIVE STATEMENT
81 yo female with a pmh of htn and hld presents c/o R back pain for 5 days. pt states she might have pulled her back while opening a window but denies any falls/trauma. pt notes aggravation od pain with positional changes and has had some relied with tylenol. pt denies any urinary retention/incontinence or saddle anesthesias. pt denies any other symptoms including fevers, chill, headache, recent illness/travel, cough, abdominal pain, chest pain, or SOB.

## 2023-05-24 NOTE — ED PROVIDER NOTE - NSFOLLOWUPINSTRUCTIONS_ED_ALL_ED_FT
Musculoskeletal Pain    Musculoskeletal pain is muscle and todd aches and pains. These pains can occur in any part of the body. Your caregiver may treat you without knowing the cause of the pain. They may treat you if blood or urine tests, X-rays, and other tests were normal.     CAUSES  There is often not a definite cause or reason for these pains. These pains may be caused by a type of germ (virus). The discomfort may also come from overuse. Overuse includes working out too hard when your body is not fit. Todd aches also come from weather changes. Bone is sensitive to atmospheric pressure changes.    HOME CARE INSTRUCTIONS  Ask when your test results will be ready. Make sure you get your test results.  Only take over-the-counter or prescription medicines for pain, discomfort, or fever as directed by your caregiver. If you were given medications for your condition, do not drive, operate machinery or power tools, or sign legal documents for 24 hours. Do not drink alcohol. Do not take sleeping pills or other medications that may interfere with treatment.  Continue all activities unless the activities cause more pain. When the pain lessens, slowly resume normal activities. Gradually increase the intensity and duration of the activities or exercise.   During periods of severe pain, bed rest may be helpful. Lay or sit in any position that is comfortable.   Putting ice on the injured area.  Put ice in a bag.   Place a towel between your skin and the bag.  Leave the ice on for 15 to 20 minutes, 3 to 4 times a day.   Follow up with your caregiver for continued problems and no reason can be found for the pain. If the pain becomes worse or does not go away, it may be necessary to repeat tests or do additional testing. Your caregiver may need to look further for a possible cause.    SEEK IMMEDIATE MEDICAL CARE IF:  You have pain that is getting worse and is not relieved by medications.  You develop chest pain that is associated with shortness or breath, sweating, feeling sick to your stomach (nauseous), or throw up (vomit).  Your pain becomes localized to the abdomen.  You develop any new symptoms that seem different or that concern you.    MAKE SURE YOU:  Understand these instructions.   Will watch your condition.  Will get help right away if you are not doing well or get worse.    ADDITIONAL NOTES AND INSTRUCTIONS    Please follow up with your Primary MD in 24-48 hr.  Seek immediate medical care for any new/worsening signs or symptoms.

## 2023-05-24 NOTE — ED ADULT NURSE NOTE - DISCHARGE DATE/TIME
----- Message from Maria Victoria Renner sent at 9/5/2017 11:27 AM CDT -----  Contact: self/[phone]  Pt called in regards to getting a Rx refill for zolpidem (AMBIEN) 5 MG Tab.      [facility]  Please advise   24-May-2023 14:44

## 2023-05-24 NOTE — ED ADULT NURSE NOTE - NSFALLUNIVINTERV_ED_ALL_ED
Monitor for mental status changes and reorient to person, place, and time, as needed/Reinforce activity limits and safety measures with patient and family/Bed/Stretcher in lowest position, wheels locked, appropriate side rails in place/Call bell, personal items and telephone in reach/Instruct patient to call for assistance before getting out of bed/chair/stretcher/Non-slip footwear applied when patient is off stretcher/[place] to call system/Physically safe environment - no spills, clutter or unnecessary equipment/Purposeful proactive rounding/Room/bathroom lighting operational, light cord in reach

## 2023-05-24 NOTE — ED ADULT NURSE NOTE - NS ED NURSE LEVEL OF CONSCIOUSNESS SPEECH
08/17/20 0827   Pain Assessment   Pain Assessment Tool 0-10   Pain Score 7   Pain Location/Orientation Orientation: Right;Location: Knee   Pain Onset/Description Onset: Ongoing   Patient's Stated Pain Goal No pain   Multiple Pain Sites No   Restrictions/Precautions   Weight Bearing Precautions Per Order No   Other Precautions Cognitive; Fall Risk;Pain   General   Chart Reviewed Yes   Family/Caregiver Present No   Cognition   Overall Cognitive Status WFL   Arousal/Participation Alert; Cooperative   Attention Attends with cues to redirect   Orientation Level Oriented X4   Memory Decreased recall of precautions;Decreased recall of recent events;Decreased short term memory   Following Commands Follows one step commands with increased time or repetition   Comments pt agreed to PT treatment   Subjective   Subjective "I'm very tired today "   Bed Mobility   Rolling R 5  Supervision   Additional items Assist x 1;HOB elevated; Bedrails; Increased time required;Verbal cues   Rolling L 5  Supervision   Additional items Assist x 1;HOB elevated; Bedrails; Increased time required;Verbal cues   Supine to Sit 5  Supervision   Additional items Assist x 1;HOB elevated; Bedrails; Increased time required;Verbal cues   Sit to Supine 3  Moderate assistance   Additional items Assist x 2; Increased time required;Verbal cues   Additional Comments pt sat at EOB x 15 mins self supported   Transfers   Sit to Stand 3  Moderate assistance   Additional items Assist x 2;Bedrails; Increased time required;Verbal cues   Stand to Sit 3  Moderate assistance   Additional items Assist x 2; Increased time required;Verbal cues   Additional Comments stood 5 sec x 3 trials max x 2, repositioned in bed max x 2 for comfort   Ambulation/Elevation   Gait pattern Not appropriate; Not tested   Balance   Static Sitting Good   Dynamic Sitting Fair +   Static Standing Poor -   Dynamic Standing Poor -   Endurance Deficit   Endurance Deficit Yes   Activity Tolerance Activity Tolerance Patient limited by fatigue;Patient limited by pain   Exercises   Balance training  sitting   Assessment   Prognosis Fair   Problem List Decreased strength;Decreased endurance; Impaired balance;Decreased mobility; Decreased cognition;Decreased safety awareness;Pain   Assessment Pt seen for PT treatment session this date with interventions consisting of therapeutic activity consisting of training: bed mobility, supine<>sit transfers, sit<>stand transfers, static sitting tolerance at EOB for 15 minutes w/ B UE support and static standing tolerance for less than 1(5 sec x 3 trials) minutes w/ B UE support  Pt agreeable to PT treatment session upon arrival, pt found supine in bed w/ HOB elevated, in no apparent distress  In comparison to previous session, pt with no improvements as evidenced by decreased activity tolerance-standing & she deferred LE ex  Post session: pt returned BTB and all needs in reach Continue to recommend STR at time of d/c in order to maximize pt's functional independence and safety w/ mobility  Pt continues to be functioning below baseline level, and remains limited 2* factors listed above and including decreased sttength, endurance & safe functional mobility  PT will continue to see pt while here in order to address the deficits listed above and provide interventions consistent w/ POC in effort to achieve STGs  Goals   Patient Goals to get better   PT Treatment Day 5   Plan   Treatment/Interventions Functional transfer training;LE strengthening/ROM; Therapeutic exercise; Endurance training;Cognitive reorientation;Patient/family training;Equipment eval/education; Bed mobility;Gait training   Progress Slow progress, decreased activity tolerance   PT Frequency   (3-5 x week)   Recommendation   PT Discharge Recommendation Post-Acute Rehabilitation Services   Equipment Recommended Wheelchair;Walker   PT - OK to Discharge Yes  (when med cleared to [facility])   Willy Wellington, PTA Speaking Coherently

## 2023-05-24 NOTE — ED PROVIDER NOTE - PATIENT PORTAL LINK FT
You can access the FollowMyHealth Patient Portal offered by [facility] by registering at the following website: [email or web address]. By joining [employer]’s FollowMyHealth portal, you will also be able to view your health information using other applications (apps) compatible with our system.

## 2023-05-24 NOTE — ED PROVIDER NOTE - PHYSICAL EXAMINATION
Gen: NAD, AOx3  Head: NCAT  HEENT: PERRL, oral mucosa moist, normal conjunctiva, oropharynx clear without exudate or erythema  Lung: CTAB, no respiratory distress, no wheezing, rales, rhonchi  CV: normal s1/s2, rrr, Normal perfusion, pulses 2+ throughout  Abd: soft, NTND, no CVA tenderness  Genitourinary: no pelvic tenderness  MSK: No edema, no visible deformities, full range of motion in all 4 extremities, no spinal tenderness, TTP R upper back   Neuro: CN II-XII grossly intact, No focal neurologic deficits, sensation intact, strength 5/5 BUE/BLE, steady gait   Skin: No rash, ecchymosis, erythema   Psych: normal affect

## 2023-05-24 NOTE — ED PROVIDER NOTE - NS ED ATTENDING STATEMENT MOD
This was a shared visit with the SUHAIL. I reviewed and verified the documentation and independently performed the documented:

## 2023-05-24 NOTE — ED PROVIDER NOTE - CLINICAL SUMMARY MEDICAL DECISION MAKING FREE TEXT BOX
82-year-old female present to the ED for right upper back and right thorax pain.  Worsened with movement.  Worsened with arm movement.  Physical exam with right upper posterior thorax tenderness to palpation.  Lung sounds clear bilaterally.  Increasing range of motion of the right upper extremity worsens the pain in the right chest wall.  Vitals reviewed by me noted to be within normal limits.  Chest x-ray unremarkable.  Given lidocaine patch and muscle relaxant with moderately for pain.  Lidocaine patch and Motrin sent to the pharmacy.  Discharged home.  Return precautions given.

## 2023-05-25 PROBLEM — E78.00 HIGH CHOLESTEROL: Status: ACTIVE | Noted: 2023-05-24

## 2023-05-25 PROBLEM — K51.90 ULCERATIVE COLITIS: Status: ACTIVE | Noted: 2023-05-24

## 2023-06-05 PROBLEM — K64.2 GRADE III HEMORRHOIDS: Status: ACTIVE | Noted: 2023-06-05

## 2023-06-05 NOTE — PHYSICAL EXAM
[FreeTextEntry1] : General: Awake, Alert, No Acute Distress\par Respiratory: Normal Respiratory Effort\par Cardiovascular: Normal Rate and Rhythm\par Abdomen: Soft, non-tender, non-distended. \par Rectal: External examination shows large external hemorrhoid tissue particularly in the right poster lateral location.\par

## 2023-06-05 NOTE — HISTORY OF PRESENT ILLNESS
[FreeTextEntry1] : Patient is an 82-year-old female with a PMHx of hypertension, hyperlipidemia, urinary incontinence diverticulitis, who presents for evaluation of her hemorrhoids. Patient reports long standing hemorrhoidal issues since the birth of her children many years ago. She reports prolapse and bleeding with pain at times. She has one to two bowel movements daily and currently takes fiber pills and MiraLAX. She denies recent fevers, chills, nausea, vomiting, abdominal pain, constipation, or diarrhea. She denies a family history of colon cancer rectal cancer inflammatory bowel disease. Her last colonoscopy was on June 2021 with Dr. Leary, which showed severe diverticulosis and second degree hemorrhoids.\par

## 2023-06-05 NOTE — ADDENDUM
[FreeTextEntry1] : I, Lauren Edwards ([facility]) assisted in filling out this chart under the dictation of Dr. Savage Argueta on 05/25/2023

## 2023-06-05 NOTE — PROCEDURE
[FreeTextEntry1] : Digital rectal exam and anoscopy shows normal sphincter tone large internal hemorrhoids and no masses.\par \par

## 2023-06-05 NOTE — ASSESSMENT
[FreeTextEntry1] : 82-year-old female with bleeding grade three hemorrhoids.\par \par I discussed pathology bleeding grade three hemorrhoids with the patient I recommended she continue with high-fiber diet fiber supplementation and MiraLAX. We discussed banding VS. surgery patient would like to proceed with hemorrhoidal banding. We'll see her back for next available banding appointment.

## 2023-06-27 ENCOUNTER — APPOINTMENT (OUTPATIENT)
Dept: SURGERY | Facility: CLINIC | Age: 83
End: 2023-06-27

## 2023-08-25 NOTE — ED PROVIDER NOTE - PHYSICAL EXAMINATION
Patient reports working with [employer]  
Render In Strict Bullet Format?: No
Plan: Dupixent on reserve
Detail Level: Zone
Initiate Treatment: Rx: opzelura 1.5% topical
Initiate Treatment: OTC hydrocortisone 1% cream\\nOpzelura PRN
VITAL SIGNS: I have reviewed nursing notes and confirm.  CONSTITUTIONAL: non-toxic, well appearing, appropriate  SKIN: Mild papular rash to bilateral buttocks without blotches and no crepitus, no petechiae.  EYES: Pink conjunctiva, anicteric  ENT: tongue midline, MMM  NECK: Supple  CARD: RRR, no murmurs, equal radial pulses bilaterally 2+  RESP: CTAB, no respiratory distress  ABD: Soft, non-tender, non-distended, no peritoneal signs, no HSM, no CVA tenderness  Rectal exam: (Chaperoned by Dr. Cullen) -no evidence of external thrombosed hemorrhoids, no gross blood, tenderness with finger insertion however no internal hemorrhoids palpated.  No obvious fissures.  No blood in the stool.  EXT: Normal ROM x4. No edema. No calves tenderness  NEURO: Alert, oriented. CN2-12 intact, equal strength bilaterally, nl gait.  PSYCH: Cooperative, appropriate.

## 2023-10-04 NOTE — PATIENT PROFILE ADULT - FLU SEASON?
If you are a smoker, it is important for your health to stop smoking. Please be aware that second hand smoke is also harmful.
Yes...

## 2023-11-04 ENCOUNTER — EMERGENCY (EMERGENCY)
Facility: HOSPITAL | Age: 83
LOS: 0 days | Discharge: HOME | End: 2023-11-04
Attending: EMERGENCY MEDICINE
Payer: MEDICARE

## 2023-11-04 VITALS
SYSTOLIC BLOOD PRESSURE: 129 MMHG | TEMPERATURE: 98 F | HEART RATE: 77 BPM | RESPIRATION RATE: 18 BRPM | DIASTOLIC BLOOD PRESSURE: 77 MMHG | OXYGEN SATURATION: 98 %

## 2023-11-04 DIAGNOSIS — E78.5 HYPERLIPIDEMIA, UNSPECIFIED: ICD-10-CM

## 2023-11-04 DIAGNOSIS — Z87.19 PERSONAL HISTORY OF OTHER DISEASES OF THE DIGESTIVE SYSTEM: ICD-10-CM

## 2023-11-04 DIAGNOSIS — Z98.49 CATARACT EXTRACTION STATUS, UNSPECIFIED EYE: Chronic | ICD-10-CM

## 2023-11-04 DIAGNOSIS — R42 DIZZINESS AND GIDDINESS: ICD-10-CM

## 2023-11-04 DIAGNOSIS — K64.4 RESIDUAL HEMORRHOIDAL SKIN TAGS: ICD-10-CM

## 2023-11-04 DIAGNOSIS — R10.9 UNSPECIFIED ABDOMINAL PAIN: ICD-10-CM

## 2023-11-04 DIAGNOSIS — I10 ESSENTIAL (PRIMARY) HYPERTENSION: ICD-10-CM

## 2023-11-04 LAB
ALBUMIN SERPL ELPH-MCNC: 4.9 G/DL — SIGNIFICANT CHANGE UP (ref 3.5–5.2)
ALBUMIN SERPL ELPH-MCNC: 4.9 G/DL — SIGNIFICANT CHANGE UP (ref 3.5–5.2)
ALP SERPL-CCNC: 89 U/L — SIGNIFICANT CHANGE UP (ref 30–115)
ALP SERPL-CCNC: 89 U/L — SIGNIFICANT CHANGE UP (ref 30–115)
ALT FLD-CCNC: 22 U/L — SIGNIFICANT CHANGE UP (ref 0–41)
ALT FLD-CCNC: 22 U/L — SIGNIFICANT CHANGE UP (ref 0–41)
ANION GAP SERPL CALC-SCNC: 11 MMOL/L — SIGNIFICANT CHANGE UP (ref 7–14)
ANION GAP SERPL CALC-SCNC: 11 MMOL/L — SIGNIFICANT CHANGE UP (ref 7–14)
AST SERPL-CCNC: 25 U/L — SIGNIFICANT CHANGE UP (ref 0–41)
AST SERPL-CCNC: 25 U/L — SIGNIFICANT CHANGE UP (ref 0–41)
BASOPHILS # BLD AUTO: 0.05 K/UL — SIGNIFICANT CHANGE UP (ref 0–0.2)
BASOPHILS # BLD AUTO: 0.05 K/UL — SIGNIFICANT CHANGE UP (ref 0–0.2)
BASOPHILS NFR BLD AUTO: 1 % — SIGNIFICANT CHANGE UP (ref 0–1)
BASOPHILS NFR BLD AUTO: 1 % — SIGNIFICANT CHANGE UP (ref 0–1)
BILIRUB SERPL-MCNC: 0.4 MG/DL — SIGNIFICANT CHANGE UP (ref 0.2–1.2)
BILIRUB SERPL-MCNC: 0.4 MG/DL — SIGNIFICANT CHANGE UP (ref 0.2–1.2)
BUN SERPL-MCNC: 17 MG/DL — SIGNIFICANT CHANGE UP (ref 10–20)
BUN SERPL-MCNC: 17 MG/DL — SIGNIFICANT CHANGE UP (ref 10–20)
CALCIUM SERPL-MCNC: 9.8 MG/DL — SIGNIFICANT CHANGE UP (ref 8.4–10.5)
CALCIUM SERPL-MCNC: 9.8 MG/DL — SIGNIFICANT CHANGE UP (ref 8.4–10.5)
CHLORIDE SERPL-SCNC: 102 MMOL/L — SIGNIFICANT CHANGE UP (ref 98–110)
CHLORIDE SERPL-SCNC: 102 MMOL/L — SIGNIFICANT CHANGE UP (ref 98–110)
CO2 SERPL-SCNC: 26 MMOL/L — SIGNIFICANT CHANGE UP (ref 17–32)
CO2 SERPL-SCNC: 26 MMOL/L — SIGNIFICANT CHANGE UP (ref 17–32)
CREAT SERPL-MCNC: 0.7 MG/DL — SIGNIFICANT CHANGE UP (ref 0.7–1.5)
CREAT SERPL-MCNC: 0.7 MG/DL — SIGNIFICANT CHANGE UP (ref 0.7–1.5)
EGFR: 87 ML/MIN/1.73M2 — SIGNIFICANT CHANGE UP
EGFR: 87 ML/MIN/1.73M2 — SIGNIFICANT CHANGE UP
EOSINOPHIL # BLD AUTO: 0.06 K/UL — SIGNIFICANT CHANGE UP (ref 0–0.7)
EOSINOPHIL # BLD AUTO: 0.06 K/UL — SIGNIFICANT CHANGE UP (ref 0–0.7)
EOSINOPHIL NFR BLD AUTO: 1.2 % — SIGNIFICANT CHANGE UP (ref 0–8)
EOSINOPHIL NFR BLD AUTO: 1.2 % — SIGNIFICANT CHANGE UP (ref 0–8)
GLUCOSE SERPL-MCNC: 97 MG/DL — SIGNIFICANT CHANGE UP (ref 70–99)
GLUCOSE SERPL-MCNC: 97 MG/DL — SIGNIFICANT CHANGE UP (ref 70–99)
HCT VFR BLD CALC: 46.9 % — SIGNIFICANT CHANGE UP (ref 37–47)
HCT VFR BLD CALC: 46.9 % — SIGNIFICANT CHANGE UP (ref 37–47)
HGB BLD-MCNC: 15.7 G/DL — SIGNIFICANT CHANGE UP (ref 12–16)
HGB BLD-MCNC: 15.7 G/DL — SIGNIFICANT CHANGE UP (ref 12–16)
IMM GRANULOCYTES NFR BLD AUTO: 0.2 % — SIGNIFICANT CHANGE UP (ref 0.1–0.3)
IMM GRANULOCYTES NFR BLD AUTO: 0.2 % — SIGNIFICANT CHANGE UP (ref 0.1–0.3)
LACTATE SERPL-SCNC: 0.7 MMOL/L — SIGNIFICANT CHANGE UP (ref 0.7–2)
LACTATE SERPL-SCNC: 0.7 MMOL/L — SIGNIFICANT CHANGE UP (ref 0.7–2)
LIDOCAIN IGE QN: 50 U/L — SIGNIFICANT CHANGE UP (ref 7–60)
LIDOCAIN IGE QN: 50 U/L — SIGNIFICANT CHANGE UP (ref 7–60)
LYMPHOCYTES # BLD AUTO: 1.3 K/UL — SIGNIFICANT CHANGE UP (ref 1.2–3.4)
LYMPHOCYTES # BLD AUTO: 1.3 K/UL — SIGNIFICANT CHANGE UP (ref 1.2–3.4)
LYMPHOCYTES # BLD AUTO: 25 % — SIGNIFICANT CHANGE UP (ref 20.5–51.1)
LYMPHOCYTES # BLD AUTO: 25 % — SIGNIFICANT CHANGE UP (ref 20.5–51.1)
MCHC RBC-ENTMCNC: 31.5 PG — HIGH (ref 27–31)
MCHC RBC-ENTMCNC: 31.5 PG — HIGH (ref 27–31)
MCHC RBC-ENTMCNC: 33.5 G/DL — SIGNIFICANT CHANGE UP (ref 32–37)
MCHC RBC-ENTMCNC: 33.5 G/DL — SIGNIFICANT CHANGE UP (ref 32–37)
MCV RBC AUTO: 94.2 FL — SIGNIFICANT CHANGE UP (ref 81–99)
MCV RBC AUTO: 94.2 FL — SIGNIFICANT CHANGE UP (ref 81–99)
MONOCYTES # BLD AUTO: 0.32 K/UL — SIGNIFICANT CHANGE UP (ref 0.1–0.6)
MONOCYTES # BLD AUTO: 0.32 K/UL — SIGNIFICANT CHANGE UP (ref 0.1–0.6)
MONOCYTES NFR BLD AUTO: 6.2 % — SIGNIFICANT CHANGE UP (ref 1.7–9.3)
MONOCYTES NFR BLD AUTO: 6.2 % — SIGNIFICANT CHANGE UP (ref 1.7–9.3)
NEUTROPHILS # BLD AUTO: 3.46 K/UL — SIGNIFICANT CHANGE UP (ref 1.4–6.5)
NEUTROPHILS # BLD AUTO: 3.46 K/UL — SIGNIFICANT CHANGE UP (ref 1.4–6.5)
NEUTROPHILS NFR BLD AUTO: 66.4 % — SIGNIFICANT CHANGE UP (ref 42.2–75.2)
NEUTROPHILS NFR BLD AUTO: 66.4 % — SIGNIFICANT CHANGE UP (ref 42.2–75.2)
NRBC # BLD: 0 /100 WBCS — SIGNIFICANT CHANGE UP (ref 0–0)
NRBC # BLD: 0 /100 WBCS — SIGNIFICANT CHANGE UP (ref 0–0)
PLATELET # BLD AUTO: 219 K/UL — SIGNIFICANT CHANGE UP (ref 130–400)
PLATELET # BLD AUTO: 219 K/UL — SIGNIFICANT CHANGE UP (ref 130–400)
PMV BLD: 11 FL — HIGH (ref 7.4–10.4)
PMV BLD: 11 FL — HIGH (ref 7.4–10.4)
POTASSIUM SERPL-MCNC: 4.1 MMOL/L — SIGNIFICANT CHANGE UP (ref 3.5–5)
POTASSIUM SERPL-MCNC: 4.1 MMOL/L — SIGNIFICANT CHANGE UP (ref 3.5–5)
POTASSIUM SERPL-SCNC: 4.1 MMOL/L — SIGNIFICANT CHANGE UP (ref 3.5–5)
POTASSIUM SERPL-SCNC: 4.1 MMOL/L — SIGNIFICANT CHANGE UP (ref 3.5–5)
PROT SERPL-MCNC: 7.6 G/DL — SIGNIFICANT CHANGE UP (ref 6–8)
PROT SERPL-MCNC: 7.6 G/DL — SIGNIFICANT CHANGE UP (ref 6–8)
RBC # BLD: 4.98 M/UL — SIGNIFICANT CHANGE UP (ref 4.2–5.4)
RBC # BLD: 4.98 M/UL — SIGNIFICANT CHANGE UP (ref 4.2–5.4)
RBC # FLD: 12.8 % — SIGNIFICANT CHANGE UP (ref 11.5–14.5)
RBC # FLD: 12.8 % — SIGNIFICANT CHANGE UP (ref 11.5–14.5)
SODIUM SERPL-SCNC: 139 MMOL/L — SIGNIFICANT CHANGE UP (ref 135–146)
SODIUM SERPL-SCNC: 139 MMOL/L — SIGNIFICANT CHANGE UP (ref 135–146)
TROPONIN T SERPL-MCNC: <0.01 NG/ML — SIGNIFICANT CHANGE UP
TROPONIN T SERPL-MCNC: <0.01 NG/ML — SIGNIFICANT CHANGE UP
WBC # BLD: 5.2 K/UL — SIGNIFICANT CHANGE UP (ref 4.8–10.8)
WBC # BLD: 5.2 K/UL — SIGNIFICANT CHANGE UP (ref 4.8–10.8)
WBC # FLD AUTO: 5.2 K/UL — SIGNIFICANT CHANGE UP (ref 4.8–10.8)
WBC # FLD AUTO: 5.2 K/UL — SIGNIFICANT CHANGE UP (ref 4.8–10.8)

## 2023-11-04 PROCEDURE — 71045 X-RAY EXAM CHEST 1 VIEW: CPT | Mod: 26

## 2023-11-04 PROCEDURE — 85025 COMPLETE CBC W/AUTO DIFF WBC: CPT

## 2023-11-04 PROCEDURE — 93005 ELECTROCARDIOGRAM TRACING: CPT

## 2023-11-04 PROCEDURE — 87086 URINE CULTURE/COLONY COUNT: CPT

## 2023-11-04 PROCEDURE — 99285 EMERGENCY DEPT VISIT HI MDM: CPT

## 2023-11-04 PROCEDURE — 93010 ELECTROCARDIOGRAM REPORT: CPT

## 2023-11-04 PROCEDURE — 84484 ASSAY OF TROPONIN QUANT: CPT

## 2023-11-04 PROCEDURE — 83605 ASSAY OF LACTIC ACID: CPT

## 2023-11-04 PROCEDURE — 71045 X-RAY EXAM CHEST 1 VIEW: CPT

## 2023-11-04 PROCEDURE — 74177 CT ABD & PELVIS W/CONTRAST: CPT | Mod: MA

## 2023-11-04 PROCEDURE — 80053 COMPREHEN METABOLIC PANEL: CPT

## 2023-11-04 PROCEDURE — 36415 COLL VENOUS BLD VENIPUNCTURE: CPT

## 2023-11-04 PROCEDURE — 74177 CT ABD & PELVIS W/CONTRAST: CPT | Mod: 26,MA

## 2023-11-04 PROCEDURE — 83690 ASSAY OF LIPASE: CPT

## 2023-11-04 PROCEDURE — 99285 EMERGENCY DEPT VISIT HI MDM: CPT | Mod: 25

## 2023-11-04 RX ORDER — SODIUM CHLORIDE 9 MG/ML
1000 INJECTION, SOLUTION INTRAVENOUS ONCE
Refills: 0 | Status: COMPLETED | OUTPATIENT
Start: 2023-11-04 | End: 2023-11-04

## 2023-11-04 RX ADMIN — SODIUM CHLORIDE 1000 MILLILITER(S): 9 INJECTION, SOLUTION INTRAVENOUS at 12:29

## 2023-11-04 NOTE — ED PROVIDER NOTE - NSFOLLOWUPINSTRUCTIONS_ED_ALL_ED_FT
Please follow up with your primary care provider within the next 1-3 days.     Acute Abdominal Pain    WHAT YOU NEED TO KNOW:    The cause of your abdominal pain may not be found. If a cause is found, treatment will depend on what the cause is.     DISCHARGE INSTRUCTIONS:    Return to the emergency department if:     You vomit blood or cannot stop vomiting.    You have blood in your bowel movement or it looks like tar.     You have bleeding from your rectum.     Your abdomen is larger than usual, more painful, and hard.     You have severe pain in your abdomen.     You stop passing gas and having bowel movements.     You feel weak, dizzy, or faint.    Contact your healthcare provider if:     You have a fever.    You have new signs and symptoms.    Your symptoms do not get better with treatment.     You have questions or concerns about your condition or care.    Medicines may be given to decrease pain, treat an infection, and manage your symptoms. Take your medicine as directed. Call your healthcare provider if you think your medicine is not helping or if you have side effects. Tell him if you are allergic to any medicine. Keep a list of the medicines, vitamins, and herbs you take. Include the amounts, and when and why you take them. Bring the list or the pill bottles to follow-up visits. Carry your medicine list with you in case of an emergency.    Manage your symptoms:     Apply heat on your abdomen for 20 to 30 minutes every 2 hours for as many days as directed. Heat helps decrease pain and muscle spasms.     Manage your stress. Stress may cause abdominal pain. Your healthcare provider may recommend relaxation techniques and deep breathing exercises to help decrease your stress. Your healthcare provider may recommend you talk to someone about your stress or anxiety, such as a counselor or a trusted friend. Get plenty of sleep and exercise regularly.     Limit or do not drink alcohol. Alcohol can make your abdominal pain worse. Ask your healthcare provider if it is safe for you to drink alcohol. Also ask how much is safe for you to drink.       Do not smoke. Nicotine and other chemicals in cigarettes can damage your esophagus and stomach. Ask your healthcare provider for information if you currently smoke and need help to quit. E-cigarettes or smokeless tobacco still contain nicotine. Talk to your healthcare provider before you use these products.     Make changes to the food you eat as directed: Do not eat foods that cause abdominal pain or other symptoms. Eat small meals more often.     Eat more high-fiber foods if you are constipated. High-fiber foods include fruits, vegetables, whole-grain foods, and legumes.     Do not eat foods that cause gas if you have bloating. Examples include broccoli, cabbage, and cauliflower. Do not drink soda or carbonated drinks, because these may also cause gas.     Do not eat foods or drinks that contain sorbitol or fructose if you have diarrhea and bloating. Some examples are fruit juices, candy, jelly, and sugar-free gum.     Do not eat high-fat foods, such as fried foods, cheeseburgers, hot dogs, and desserts.    Limit or do not drink caffeine. Caffeine may make symptoms, such as heart burn or nausea, worse.       Drink plenty of liquids to prevent dehydration from diarrhea or vomiting. Ask your healthcare provider how much liquid to drink each day and which liquids are best for you.     Follow up with your healthcare provider as directed: Write down your questions so you remember to ask them during your visits.       © Copyright [employer] 2019 All illustrations and images included in CareNotes are the copyrighted property of [employer] Inc[employer] or [employer]

## 2023-11-04 NOTE — ED PROVIDER NOTE - PHYSICAL EXAMINATION
VITAL SIGNS: I have reviewed nursing notes and confirm.  CONSTITUTIONAL: In no acute distress.  SKIN: Skin exam is warm and dry.  EYES: PERRL, right sided cataract surgery. No conjunctival pallor.   CARD: S1, S2; Regular rate and rhythm.  RESP: CTAB. Speaking in full sentences.   ABD: Normal bowel sounds; soft; non-distended; TTP of the left lower quadrant without rebound or guarding. No CVA tenderness. Rectal exam performed with verbal consent and chaperoned by IKE Gargeric: Non-thrombosed external hemorrhoid that is manually reduced. No BRBPR or stool on glove. Good sphincter tone.   EXT: No edema.  NEURO: Alert, oriented.

## 2023-11-04 NOTE — ED PROVIDER NOTE - OBJECTIVE STATEMENT
Patient is a 83y Female PMH HLD, HTN, BPPV who presents with constant, non-radiating cramping left sided abdominal pain, lightheadedness, and 7-8 episodes of soft stools with mucus per day since Thursday, as well as "black tar" stool that began today. Patient reports hemorrhoids with BRBPR and rectal pressure made worse with ambulation and defecation. Last colonoscopy 2 years ago, found to have diverticulitis but states this feels different than her typical flare-ups. Denies fever, chills, nausea, vomiting, or changes in appetite. Took Amox 2 weeks ago for a dental infection.

## 2023-11-04 NOTE — ED PROVIDER NOTE - CLINICAL SUMMARY MEDICAL DECISION MAKING FREE TEXT BOX
83-year-old female presents for abdominal discomfort.  In the emergency department had screening exam, labs and imaging, no acute emergent findings, will start probiotics due to recent dental infection and antibiotic usage, no signs of C. difficile on work-up.  Offered to send stool sample here, patient cannot provide, will provide good return instructions and outpatient follow-up with colorectal surgery.

## 2023-11-04 NOTE — ED PROVIDER NOTE - ATTENDING APP SHARED VISIT CONTRIBUTION OF CARE
I personally evaluated the patient. I reviewed the Resident´s or Physician Assistant´s note (as assigned above), and agree with the findings and plan except as documented in my note.    83-year-old female presents to the emergency department for bloating, dark stools and abdominal discomfort after recently starting antibiotics for dental infection which has since concluded.  No vomiting or diarrhea but admits to difficulties with bowel movements.  No constitutional symptoms, no fevers no chills no syncope no chest discomfort or shortness of breath.    PMH includes diverticulitis but states this feels differently also known to have hemorrhoids.    The review of systems is otherwise unremarkable    GENERAL: female in no distress.   HEENT: EOMI   CHEST: normal work of breathing noted  CV: pulses intact  Abdomen: Soft, nonrigid, nondistended, no rebound or guarding  Rectal: No stool no blood  EXTR: FROM   NEURO: AAO 3 no focal deficits  SKIN: normal no pallor   PSYCH: normal mood & mentation    Impression: Abdominal pain    Plan: IV, labs, imaging, supportive care & reevaluation

## 2023-11-04 NOTE — ED PROVIDER NOTE - PATIENT PORTAL LINK FT
You can access the FollowMyHealth Patient Portal offered by [facility] by registering at the following website: [email or web address]. By joining [employer]’s FollowMyHealth portal, you will also be able to view your health information using other applications (apps) compatible with our system.

## 2023-11-06 LAB
CULTURE RESULTS: SIGNIFICANT CHANGE UP
CULTURE RESULTS: SIGNIFICANT CHANGE UP
SPECIMEN SOURCE: SIGNIFICANT CHANGE UP
SPECIMEN SOURCE: SIGNIFICANT CHANGE UP

## 2023-11-06 NOTE — CHART NOTE - NSCHARTNOTEFT_GEN_A_CORE
[facility] MRN [id number] / Pt already has established care with colorectal surgeon 11/6 - JL    Specialty: colorectal surgery

## 2024-03-11 ENCOUNTER — EMERGENCY (EMERGENCY)
Facility: HOSPITAL | Age: 84
LOS: 0 days | Discharge: HOME | End: 2024-03-11
Attending: EMERGENCY MEDICINE
Payer: MEDICARE

## 2024-03-11 VITALS
TEMPERATURE: 98 F | SYSTOLIC BLOOD PRESSURE: 134 MMHG | WEIGHT: 130.07 LBS | RESPIRATION RATE: 19 BRPM | HEART RATE: 84 BPM | OXYGEN SATURATION: 96 % | DIASTOLIC BLOOD PRESSURE: 65 MMHG | HEIGHT: 62 IN

## 2024-03-11 DIAGNOSIS — M79.18 MYALGIA, OTHER SITE: ICD-10-CM

## 2024-03-11 DIAGNOSIS — M54.50 LOW BACK PAIN, UNSPECIFIED: ICD-10-CM

## 2024-03-11 DIAGNOSIS — Z98.49 CATARACT EXTRACTION STATUS, UNSPECIFIED EYE: Chronic | ICD-10-CM

## 2024-03-11 PROCEDURE — 99284 EMERGENCY DEPT VISIT MOD MDM: CPT

## 2024-03-11 PROCEDURE — 96372 THER/PROPH/DIAG INJ SC/IM: CPT

## 2024-03-11 PROCEDURE — 73502 X-RAY EXAM HIP UNI 2-3 VIEWS: CPT | Mod: 26,RT

## 2024-03-11 PROCEDURE — 99283 EMERGENCY DEPT VISIT LOW MDM: CPT | Mod: 25

## 2024-03-11 PROCEDURE — 73502 X-RAY EXAM HIP UNI 2-3 VIEWS: CPT | Mod: RT

## 2024-03-11 RX ORDER — LIDOCAINE 4 G/100G
1 CREAM TOPICAL ONCE
Refills: 0 | Status: COMPLETED | OUTPATIENT
Start: 2024-03-11 | End: 2024-03-11

## 2024-03-11 RX ORDER — ACETAMINOPHEN 500 MG
2 TABLET ORAL
Qty: 30 | Refills: 0
Start: 2024-03-11 | End: 2024-03-15

## 2024-03-11 RX ORDER — KETOROLAC TROMETHAMINE 30 MG/ML
30 SYRINGE (ML) INJECTION ONCE
Refills: 0 | Status: DISCONTINUED | OUTPATIENT
Start: 2024-03-11 | End: 2024-03-11

## 2024-03-11 RX ORDER — LIDOCAINE 4 G/100G
1 CREAM TOPICAL
Qty: 2 | Refills: 0
Start: 2024-03-11 | End: 2024-03-15

## 2024-03-11 RX ORDER — METHOCARBAMOL 500 MG/1
1000 TABLET, FILM COATED ORAL ONCE
Refills: 0 | Status: COMPLETED | OUTPATIENT
Start: 2024-03-11 | End: 2024-03-11

## 2024-03-11 RX ORDER — IBUPROFEN 200 MG
1 TABLET ORAL
Qty: 15 | Refills: 0
Start: 2024-03-11 | End: 2024-03-15

## 2024-03-11 RX ADMIN — LIDOCAINE 1 PATCH: 4 CREAM TOPICAL at 02:13

## 2024-03-11 RX ADMIN — Medication 30 MILLIGRAM(S): at 02:14

## 2024-03-11 RX ADMIN — LIDOCAINE 1 PATCH: 4 CREAM TOPICAL at 07:00

## 2024-03-11 RX ADMIN — METHOCARBAMOL 1000 MILLIGRAM(S): 500 TABLET, FILM COATED ORAL at 02:13

## 2024-03-11 NOTE — ED PROVIDER NOTE - OBJECTIVE STATEMENT
83-year-old female with no significant past medical history presents to the ED for evaluation of right buttock pain x 1 week that is worse with weightbearing.  Patient reports taking Motrin with only minimal relief.  Patient reports she walks several miles daily.  Patient denies excessive exercising, heavy lifting, rashes, weakness, numbness, tingling, urinary or bowel retention or incontinence, recent trauma, abdominal pain, nausea, vomiting, diarrhea, constipation, urinary symptoms, dizziness.

## 2024-03-11 NOTE — ED PROVIDER NOTE - HIV OFFER
Problem: Respiratory Compromise ( Infant)  Goal: Effective Oxygenation and Ventilation    Intervention: Optimize Oxygenation and Ventilation  Patient remains on 2L comfort flow. No changes made during this shift. Will continue to monitor.          Opt out

## 2024-03-11 NOTE — ED ADULT NURSE REASSESSMENT NOTE - NS ED NURSE REASSESS COMMENT FT1
pt did not wait for ambulette to go back to assisted living. pt is a&ox4 and left without telling anyone. RN called patient and stated she took her own car service back home. facility is an assisted living where patient can leave or go independently. contacted the [facility] and confirmed she is not a patient from there, she is from independent housing. pt walked steady and ambulated independently. pt is a&ox4. pt had no IV access.

## 2024-03-11 NOTE — ED ADULT NURSE NOTE - NSFALLUNIVINTERV_ED_ALL_ED
Bed/Stretcher in lowest position, wheels locked, appropriate side rails in place/Call bell, personal items and telephone in reach/Instruct patient to call for assistance before getting out of bed/chair/stretcher/Non-slip footwear applied when patient is off stretcher/[place] to call system/Physically safe environment - no spills, clutter or unnecessary equipment/Purposeful proactive rounding/Room/bathroom lighting operational, light cord in reach

## 2024-03-11 NOTE — ED PROVIDER NOTE - PATIENT PORTAL LINK FT
You can access the FollowMyHealth Patient Portal offered by [facility] by registering at the following website: [email or web address]. By joining [employer]’s FollowMyHealth portal, you will also be able to view your health information using other applications (apps) compatible with our system.

## 2024-03-11 NOTE — ED PROVIDER NOTE - PHYSICAL EXAMINATION
Physical Exam    Vital Signs: I have reviewed the initial vital signs.  Constitutional: well-nourished, appears stated age, no acute distress  Eyes: Conjunctiva pink, Sclera clear, PERRLA, EOMI without pain.  Cardiovascular: S1 and S2, regular rate, regular rhythm, well-perfused extremities, radial pulses equal and 2+ b/l.   Respiratory: unlabored respiratory effort, clear to auscultation bilaterally no wheezing, rales and rhonchi. pt is speaking full sentences. no accessory muscle use.   Gastrointestinal: soft, non-tender, nondistended abdomen, no pulsatile mass, no rebound, no guarding  Musculoskeletal: FROM of b/l upper and lower extremities, no tenderness over the hips b/l, no pain with straight leg raise b/l, no lower extremity edema, no calf tenderness, no midline tenderness, no palpable spinal step offs, (+) tenderness over the right buttock without warmth, edema, or erythema.   Integumentary: warm, dry, no rash  Neurologic: awake, alert, extremities’ motor and sensory functions grossly intact. steady gait.   Psychiatric: appropriate mood, appropriate affect

## 2024-03-11 NOTE — ED PROVIDER NOTE - PROGRESS NOTE DETAILS
FF: pt reports pain has resolved with medication. pt is able to walk to the bathroom with steady gait. I discussed radiology results with pt. pt advised of strict return precautions discussed at bedside. agreeable to dc.

## 2024-03-11 NOTE — ED ADULT NURSE NOTE - CAS EDP DISCH TYPE
[id number] [facility] Dimas Barclay [address] [place] [id number]  Phone: [phone]  Fax: [phone]    Geneva Xiomy        April 28, 2021     Patient: Gorge Chappell   YOB: 2012   Date of Visit: 4/28/2021       To Whom it May Concern:    Gorge Chappell was seen in my clinic on 4/28/2021. He may return to school on 4-29-21. If you have any questions or concerns, please don't hesitate to call.     Sincerely,         Galen Hawk PA-C Assisted living Assisted living/Assisted living facility

## 2024-03-11 NOTE — ED ADULT NURSE NOTE - CHIEF COMPLAINT QUOTE
BIBA with complaints of right lower back pain down to hip area for a week , in spite of meds, pain getting worst, unable to walk. Denies injury

## 2024-03-11 NOTE — ED ADULT TRIAGE NOTE - CHIEF COMPLAINT QUOTE
STORMY with complaints of right lower back pain down my hip area for a week , in spite of meds, pain getting worst, I can't walk BIBA with complaints of right lower back pain down to hip area for a week , in spite of meds, pain getting worst, unable to walk. Denies injury

## 2024-03-11 NOTE — ED PROVIDER NOTE - ATTENDING APP SHARED VISIT CONTRIBUTION OF CARE
Patient presents to ED c/o Rt buttock/lateral hip pain x two weeks. NO trauma. Patient denies back pain, and denies any f/c/n/v/cp/sob/abd pain. Pain is localized mostly to Rt buttock area with some radiation to Rt lateral hip area. Patient had been to doctor for it and was pending x-ray, but her symptoms continued, so had come to ED for evaluation. Patient denies any lower ext paresthesias/numbness/weakness. Denies any changes in bladder/bowel habits. No rash.   Vitals reviewed.   Lungs: CTA, no wheezing, no crackles.  Abd: +BS, NT, ND, soft,   Back: No midline vertebral column tenderness, no pain on ROM, no saddle anesthesia, and is distally NVI.   Rt buttock area has no swelling/redness/crepitus. No tenderness. No pain on ROM of the Rt hip.   B/L lower ext have full ROM and are distally NVI.   CNS: awake, alert, o x 3, no focal neurologic deficits.  A/P: Rt buttock pain,   r/o sciatica,   will obtain hip x-ray to evaluate for any DJD,   symptomatic treatment,   close outpatient follow up.

## 2024-03-11 NOTE — ED PROVIDER NOTE - CLINICAL SUMMARY MEDICAL DECISION MAKING FREE TEXT BOX
patient remained stable in ED, improved well, results of the diagnostic studies reviewed and discussed with patient, Patient remained awake, alert, comfortable, ambulatory in ED and tolerated PO. Appropriate medications given and effects reassessed.  Discussed with patient in detail about the need for close outpatient follow up and the need to return to ED for any persistent, or worsening symptoms, for any new symptoms/concerns. patient verbalized understanding and agreed. patient is given detail aftercare instructions and is instructed well to f/u as outpatient for further care.

## 2024-04-14 NOTE — ED ADULT NURSE NOTE - IS PATIENT ABLE TO BE SCREENED?
[address] , [place], [place], [address]    History & Physical Examination Note              Date:   4/14/2024  Patient name:  Shelley Jordan  Date of admission:  4/14/2024 10:36 AM  MRN:   [id number]  YOB: 1967    CHIEF COMPLAINT:       Chief Complaint   Patient presents with    Abdominal Pain     LLQ- started at 0400- last BM today, hx of n/v x1       History Obtained From:  Patient and chart review.    HPI:     The patient is a 56 y.o.  female who presented to the ED with concerns regarding worsening left sided abdominal pain that became severe. She had some left sided flank pain with radiation into the groin that was of sudden onset. Nothing made it better. She did try taking some ibuprofen with some relief at the time but the pain persisted. Prior records were reviewed concerning for thoughts of prior ischemic colitis while being admitted. GI was consulted at the time. She did have antibiotics which helped at that time. She did develop thrush from these and had concerns about being placed on them. She did have constipation/hard stools followed by diarrhea. No other known antibiotics, no known sick contacts, no known prior C.diff/exposure. She only had 1 episode of emesis at the time. She did eat garlic and pasta recently. Some GERD/reflux more than normal. No blood in the stool. In the ED, labs and imaging were obtained and were reviewed. The case was discussed with the ED Provider prior to admission. Initial lactic acid was elevated and the repeat lactic acid was WNL. CT imaging was notable for colitis. She was started empirically on IV antibiotics and provided with IVF.    PAST MEDICAL HISTORY:        has a past medical history of Colitis, Diabetes mellitus (HCC), [place] (hard of hearing), Hyperlipidemia, Hypertension, and PONV (postoperative nausea and vomiting).      Diagnosis Date    Colitis     Diabetes mellitus (HCC)     [place] (hard of hearing)     PHI HEARING AIDS     Hyperlipidemia    Adenomyosis    Hypertension    Ischemic colitis (HCC)  Resolved Problems:    * No resolved hospital problems. *      PLAN:     Patient status: Admit the patient    MEDICAL DECISION MAKING:    Primary Problem(s): Colitis  Condition is 1 or more chronic illnesses with severe exacerbation, progression, or side effects of treatment  Condition is at treatment goal  Treatment plan: Continue current treatment  Imaging: no further imaging studies ordered today  Medications: Continue current antibiotic: Cipro/Flagyl for now  Medication Monitoring / High Risk Medications: none   GI Consult  Monitor labs, renal function  Cultures are pending  Stool studies (if indicated/given diarrhea)  Zofran/Nausea control  IVF for now     Nutrition status:   Well developed, well nourished with no malnutrition  Dietician consult initiated    Hospital Prophylaxis:   DVT: SCD's   Stress Ulcer:  none indicated      MDM Data:   Management and/or test interpretation discussed with ER MD at time of admission  Consults and Nursing notes were personally reviewed, all current labs and imaging were personally reviewed, tests ordered: CBC, BMP, and history obtained by independent historian     MIPS Medication Reconciliation documentation:    [x] I have utilized all available immediate resources to obtain, update, or review the patient's current medications (including all prescriptions, over-the-counter products, herbals, cannabinoid products and vitamin/mineral/dietary/nutritional supplements.      Disposition:  Shared decision making: All test results, treatment options and disposition options were discussed with the patient today  Social determinants of health that may impact management: none  Code status: Full Code   Disposition: Discharge plan is pending    Consults: IP CONSULT TO SOCIAL WORK  IP CONSULT TO GI  PT/OT  Vital signs per unit routine  Pulse Oximetry   RT Evaluation & Treat   Clear Liquids/pending intake-clinical  Yes

## 2024-05-30 NOTE — PATIENT PROFILE ADULT - BRADEN SCORE
Continued Stay /CM Assessment/Plan of Care Note       Active Substitute Decision Maker (SDM)       MELISSA TOLLIVER [facility] Agent 1 - Spouse   Primary Phone: [phone] ([place])  Home Phone: [phone]                     Progress note:  Received order for service to home care via [facility]. Referral sent to [facility] awaiting determination.    See SW/CM flowsheets for other objective data.    Disposition Recommendations:  Preliminary discharge destination:    SW/CM recommendation for discharge: Home    Destination Pharmacy:        Discharge Plan/Needs:     Continued Care and Services - Admitted Since 5/18/2024    No active coordination exists for this encounter.           Devices/ Equipment that need to be arranged for discharge:     Accepted   Pending insurance authorization   Others:    Anticipated date of DME availability:     Prior To Hospitalization:    Living Situation: Spouse and residing at [place]    .  Support Systems: Family members, Spouse   Home Devices/Equipment: None            Mobility Assist Devices: None   Type of Service Prior to Hospitalization: None               Patient/Family discharge goal (s):  Home     Resources provided:           Prior Function  Bed Mobility: Modified Independent (05/21/24 0855 : Romi Branham, OTR/L)  Transfers: Modified Independent (05/21/24 0855 : Romi Branham, OTR/L)  Ambulation in the Home: Modified  Independent (05/21/24 0855 : Romi Branham, OTR/L)  Ambulation in the Community: Modified Independent (05/21/24 0855 : Romi Branham, OTR/L)    Current Function  Last Filed Values         Value Time User    Current Function  significantly below baseline level of function 5/28/2024 12:55 PM Cami Candelario PT    Therapy Impairments  activity tolerance; balance; strength; pain; ROM 5/28/2024 12:55 PM Cami Candelario, PT    ADLs Requiring Support  bed mobility; transfers; ambulation; stairs 5/28/2024 12:55 PM Cami Candelario, PT             Therapy Recommendations for Discharge:   PT:      Last Filed Values         Value Time User    PT Discharge Needs  intensive daily therapy 5/28/2024 12:55 PM Cami Candelario PT          OT:       Last Filed Values         Value Time User    OT Discharge Needs  intensive daily therapy 5/28/2024  4:11 PM Damaris Laboy OTA          SLP:    Last Filed Values       None            Mobility Equipment Recommended for Discharge: TBD at AIR      Barriers to Discharge  Identified Barriers to Discharge/Transition Planning:                     20

## 2024-08-05 ENCOUNTER — APPOINTMENT (OUTPATIENT)
Dept: UROLOGY | Facility: CLINIC | Age: 84
End: 2024-08-05

## 2024-08-05 PROCEDURE — 81003 URINALYSIS AUTO W/O SCOPE: CPT | Mod: QW

## 2024-08-05 PROCEDURE — 99204 OFFICE O/P NEW MOD 45 MIN: CPT

## 2024-08-06 PROBLEM — R39.15 URGENCY OF URINATION: Status: ACTIVE | Noted: 2024-08-06

## 2024-08-06 NOTE — ADDENDUM
[FreeTextEntry1] :  TARAH DURHAM, am scribing for and in the presence of  in the following sections: HISTORY OF PRESENT ILLNESS, PAST MEDICAL/FAMILY/SOCIAL HISTORY, REVIEW OF SYSTEMS, VITAL SIGNS, PHYSICAL EXAM, ASSESSMENT/PLAN on 08/05/2024.

## 2024-08-06 NOTE — ASSESSMENT
[FreeTextEntry1] :  84 year old female patient with: 1) microhematuria: 2) frequency, urgency, and urge incontinence; 3) nocturia; 4) diverticulosis; 5) back-related issues, possible compression fractures. I have asked her to do a voiding diary and we will consider her for pelvic floor therapy. I have also asked her to follow-up for cystoscopy to complete her microhematuria work-up. We will encourage her to try and void on a regular schedule. We will make further decisions after that.  All of the patient's questions were otherwise answered.   The submitted E/M billing level for this visit reflects the total time spent on the day of the visit including face-to-face time spent with the patient, non-face-to-face review of medical records and relevant information, documentation, and asynchronous communication with the patient after a visit via phone, email, or patients EHR portal after the visit. The medical records reviewed are either scanned into the chart or reviewed with the patient using a patients electronic medical records portal for patients with records not available to [facility] via electronic transmission platforms from other institutions and labs. Time spent counseling and performing coordination of care was also included in determining the appropriate EM billing level.   I have reviewed and verified information regarding the chief complaint and history recorded by the ancillary staff and/or the patient. I have independently reviewed and interpreted tests performed by other physicians and facilities as necessary.   I have discussed with the patient differential diagnosis, reason for auxiliary tests if ordered, risks, benefits, alternatives, and complications of each form of therapy were discussed.  I personally performed the services described in the documentation, reviewed the documentation recorded by the scribe in my presence, and it accurately and completely records my words and actions.

## 2024-08-06 NOTE — PHYSICAL EXAM
[General Appearance - Well Developed] : well developed [General Appearance - In No Acute Distress] : no acute distress [Abdomen Soft] : soft [Costovertebral Angle Tenderness] : no ~M costovertebral angle tenderness [Oriented To Time, Place, And Person] : oriented to person, place, and time [de-identified] : mild diffuse lower abdominal tenderness, bladder nonpalpable

## 2024-08-06 NOTE — PHYSICAL EXAM
[General Appearance - Well Developed] : well developed [General Appearance - In No Acute Distress] : no acute distress [Abdomen Soft] : soft [Costovertebral Angle Tenderness] : no ~M costovertebral angle tenderness [Oriented To Time, Place, And Person] : oriented to person, place, and time [de-identified] : mild diffuse lower abdominal tenderness, bladder nonpalpable

## 2024-08-06 NOTE — HISTORY OF PRESENT ILLNESS
[FreeTextEntry1] :  84 year old female patient seen accompanied who complains primarily of urinary frequency, urgency, and urge incontinence. She voids every hour at night and 1-2 hours during the day. She has difficulty making it to the bathroom.  There is a question of microhematuria previously. Denies any gross hematuria or significant history of UTIs or stone disease. She does have some back problems - she had a fall and there is a question of compression fractures. She has significant bowel related issues and is followed by colorectal surgeon for some constipation, mostly diarrhea, and diverticulosis. Pt had an ultrasound in April, which I have reviewed, and states that her urinary symptoms worsened quite a bit since then.   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   Renal and Bladder ultrasound done 04/2024: Normal kidneys with duplicated collecting system on the right, exophytic lower pole renal cyst bladder prevoid 187 mL; post void 40 mL

## 2024-08-06 NOTE — ASSESSMENT
[FreeTextEntry1] :  84 year old female patient with: 1) microhematuria: 2) frequency, urgency, and urge incontinence; 3) nocturia; 4) diverticulosis; 5) back-related issues, possible compression fractures. I have asked her to do a voiding diary and we will consider her for pelvic floor therapy. I have also asked her to follow-up for cystoscopy to complete her microhematuria work-up. We will encourage her to try and void on a regular schedule. We will make further decisions after that.  All of the patient's questions were otherwise answered.   The submitted E/M billing level for this visit reflects the total time spent on the day of the visit including face-to-face time spent with the patient, non-face-to-face review of medical records and relevant information, documentation, and asynchronous communication with the patient after a visit via phone, email, or patients EHR portal after the visit. The medical records reviewed are either scanned into the chart or reviewed with the patient using a patients electronic medical records portal for patients with records not available to [facility] via electronic transmission platforms from other institutions and labs. Time spent counseling and performing coordination of care was also included in determining the appropriate EM billing level.   I have reviewed and verified information regarding the chief complaint and history recorded by the ancillary staff and/or the patient. I have independently reviewed and interpreted tests performed by other physicians and facilities as necessary.   I have discussed with the patient differential diagnosis, reason for auxiliary tests if ordered, risks, benefits, alternatives, and complications of each form of therapy were discussed.  I personally performed the services described in the documentation, reviewed the documentation recorded by the scribe in my presence, and it accurately and completely records my words and actions.

## 2024-09-16 ENCOUNTER — APPOINTMENT (OUTPATIENT)
Dept: UROLOGY | Facility: CLINIC | Age: 84
End: 2024-09-16
Payer: MEDICARE

## 2024-09-16 DIAGNOSIS — R35.0 FREQUENCY OF MICTURITION: ICD-10-CM

## 2024-09-16 DIAGNOSIS — N39.41 URGE INCONTINENCE: ICD-10-CM

## 2024-09-16 DIAGNOSIS — R35.1 NOCTURIA: ICD-10-CM

## 2024-09-16 DIAGNOSIS — K51.90 ULCERATIVE COLITIS, UNSPECIFIED, W/OUT COMPLICATIONS: ICD-10-CM

## 2024-09-16 DIAGNOSIS — N30.00 ACUTE CYSTITIS W/OUT HEMATURIA: ICD-10-CM

## 2024-09-16 LAB
BILIRUB UR QL STRIP: NORMAL
COLLECTION METHOD: NORMAL
GLUCOSE UR-MCNC: NORMAL
HCG UR QL: 0.2 EU/DL
HGB UR QL STRIP.AUTO: NORMAL
KETONES UR-MCNC: NORMAL
LEUKOCYTE ESTERASE UR QL STRIP: NORMAL
NITRITE UR QL STRIP: NORMAL
PH UR STRIP: 8.5
PROT UR STRIP-MCNC: NORMAL
SP GR UR STRIP: 1.01

## 2024-09-16 PROCEDURE — 52000 CYSTOURETHROSCOPY: CPT

## 2024-09-16 PROCEDURE — 81003 URINALYSIS AUTO W/O SCOPE: CPT | Mod: QW

## 2024-09-16 RX ORDER — SULFAMETHOXAZOLE AND TRIMETHOPRIM 800; 160 MG/1; MG/1
800-160 TABLET ORAL
Qty: 6 | Refills: 0 | Status: ACTIVE | COMMUNITY
Start: 2024-09-16 | End: 1900-01-01

## 2024-09-16 RX ORDER — MIRABEGRON 25 MG/1
25 TABLET, EXTENDED RELEASE ORAL
Qty: 30 | Refills: 5 | Status: ACTIVE | COMMUNITY
Start: 2024-09-16 | End: 1900-01-01

## 2024-09-24 PROBLEM — R35.0 FREQUENCY OF MICTURITION: Status: ACTIVE | Noted: 2024-08-06

## 2024-09-24 PROBLEM — R35.1 NOCTURIA: Status: ACTIVE | Noted: 2024-08-06

## 2024-09-24 PROBLEM — N39.41 URGE INCONTINENCE: Status: ACTIVE | Noted: 2024-08-06

## 2024-09-24 LAB — URINE CYTOLOGY: NORMAL

## 2024-10-17 ENCOUNTER — OUTPATIENT (OUTPATIENT)
Dept: OUTPATIENT SERVICES | Facility: HOSPITAL | Age: 84
LOS: 1 days | End: 2024-10-17
Payer: MEDICARE

## 2024-10-17 ENCOUNTER — RESULT REVIEW (OUTPATIENT)
Age: 84
End: 2024-10-17

## 2024-10-17 ENCOUNTER — APPOINTMENT (OUTPATIENT)
Dept: UROLOGY | Facility: HOSPITAL | Age: 84
End: 2024-10-17

## 2024-10-17 DIAGNOSIS — N39.41 URGE INCONTINENCE: ICD-10-CM

## 2024-10-17 DIAGNOSIS — S32.10XA UNSPECIFIED FRACTURE OF SACRUM, INITIAL ENCOUNTER FOR CLOSED FRACTURE: ICD-10-CM

## 2024-10-17 DIAGNOSIS — R33.9 RETENTION OF URINE, UNSPECIFIED: ICD-10-CM

## 2024-10-17 DIAGNOSIS — R35.0 FREQUENCY OF MICTURITION: ICD-10-CM

## 2024-10-17 DIAGNOSIS — Z98.49 CATARACT EXTRACTION STATUS, UNSPECIFIED EYE: Chronic | ICD-10-CM

## 2024-10-17 DIAGNOSIS — R39.15 URGENCY OF URINATION: ICD-10-CM

## 2024-10-17 PROCEDURE — 72110 X-RAY EXAM L-2 SPINE 4/>VWS: CPT | Mod: 26

## 2024-10-17 PROCEDURE — 72110 X-RAY EXAM L-2 SPINE 4/>VWS: CPT

## 2024-10-17 PROCEDURE — 76000 FLUOROSCOPY <1 HR PHYS/QHP: CPT

## 2024-10-17 PROCEDURE — 51797 INTRAABDOMINAL PRESSURE TEST: CPT

## 2024-10-17 PROCEDURE — 51784 ANAL/URINARY MUSCLE STUDY: CPT

## 2024-10-17 PROCEDURE — 51784 ANAL/URINARY MUSCLE STUDY: CPT | Mod: 26

## 2024-10-17 PROCEDURE — 51797 INTRAABDOMINAL PRESSURE TEST: CPT | Mod: 26

## 2024-10-17 PROCEDURE — 74455 X-RAY URETHRA/BLADDER: CPT | Mod: 26

## 2024-10-17 PROCEDURE — 51600 INJECTION FOR BLADDER X-RAY: CPT

## 2024-10-17 PROCEDURE — 51728 CYSTOMETROGRAM W/VP: CPT | Mod: 26

## 2024-10-17 PROCEDURE — 51728 CYSTOMETROGRAM W/VP: CPT

## 2024-10-24 PROBLEM — S32.10XA SACRAL FRACTURE: Status: ACTIVE | Noted: 2024-10-17

## 2024-11-11 ENCOUNTER — APPOINTMENT (OUTPATIENT)
Dept: UROLOGY | Facility: CLINIC | Age: 84
End: 2024-11-11
Payer: MEDICARE

## 2024-11-11 VITALS
SYSTOLIC BLOOD PRESSURE: 114 MMHG | WEIGHT: 130 LBS | HEART RATE: 71 BPM | TEMPERATURE: 97.6 F | BODY MASS INDEX: 23.92 KG/M2 | DIASTOLIC BLOOD PRESSURE: 72 MMHG | RESPIRATION RATE: 16 BRPM | OXYGEN SATURATION: 98 % | HEIGHT: 62 IN

## 2024-11-11 DIAGNOSIS — N39.41 URGE INCONTINENCE: ICD-10-CM

## 2024-11-11 DIAGNOSIS — R35.1 NOCTURIA: ICD-10-CM

## 2024-11-11 DIAGNOSIS — R35.0 FREQUENCY OF MICTURITION: ICD-10-CM

## 2024-11-11 PROCEDURE — 99214 OFFICE O/P EST MOD 30 MIN: CPT

## 2024-11-11 PROCEDURE — G2211 COMPLEX E/M VISIT ADD ON: CPT

## 2024-11-13 ENCOUNTER — NON-APPOINTMENT (OUTPATIENT)
Age: 84
End: 2024-11-13

## 2025-02-04 PROCEDURE — 93010 ELECTROCARDIOGRAM REPORT: CPT

## 2025-02-05 ENCOUNTER — OUTPATIENT (OUTPATIENT)
Dept: OUTPATIENT SERVICES | Facility: HOSPITAL | Age: 85
LOS: 1 days | End: 2025-02-05
Payer: MEDICARE

## 2025-02-05 VITALS
HEIGHT: 62 IN | TEMPERATURE: 98 F | RESPIRATION RATE: 16 BRPM | DIASTOLIC BLOOD PRESSURE: 77 MMHG | WEIGHT: 130.07 LBS | OXYGEN SATURATION: 97 % | SYSTOLIC BLOOD PRESSURE: 145 MMHG | HEART RATE: 81 BPM

## 2025-02-05 DIAGNOSIS — R35.0 FREQUENCY OF MICTURITION: ICD-10-CM

## 2025-02-05 DIAGNOSIS — Z01.818 ENCOUNTER FOR OTHER PREPROCEDURAL EXAMINATION: ICD-10-CM

## 2025-02-05 DIAGNOSIS — Z98.890 OTHER SPECIFIED POSTPROCEDURAL STATES: Chronic | ICD-10-CM

## 2025-02-05 DIAGNOSIS — Z98.49 CATARACT EXTRACTION STATUS, UNSPECIFIED EYE: Chronic | ICD-10-CM

## 2025-02-05 LAB
ALBUMIN SERPL ELPH-MCNC: 4.9 G/DL — SIGNIFICANT CHANGE UP (ref 3.5–5.2)
ALP SERPL-CCNC: 98 U/L — SIGNIFICANT CHANGE UP (ref 30–115)
ALT FLD-CCNC: 27 U/L — SIGNIFICANT CHANGE UP (ref 0–41)
ANION GAP SERPL CALC-SCNC: 12 MMOL/L — SIGNIFICANT CHANGE UP (ref 7–14)
APPEARANCE UR: CLEAR — SIGNIFICANT CHANGE UP
AST SERPL-CCNC: 29 U/L — SIGNIFICANT CHANGE UP (ref 0–41)
BACTERIA # UR AUTO: NEGATIVE /HPF — SIGNIFICANT CHANGE UP
BASOPHILS # BLD AUTO: 0.04 K/UL — SIGNIFICANT CHANGE UP (ref 0–0.2)
BASOPHILS NFR BLD AUTO: 0.6 % — SIGNIFICANT CHANGE UP (ref 0–1)
BILIRUB SERPL-MCNC: 0.6 MG/DL — SIGNIFICANT CHANGE UP (ref 0.2–1.2)
BILIRUB UR-MCNC: NEGATIVE — SIGNIFICANT CHANGE UP
BUN SERPL-MCNC: 18 MG/DL — SIGNIFICANT CHANGE UP (ref 10–20)
CALCIUM SERPL-MCNC: 10.2 MG/DL — SIGNIFICANT CHANGE UP (ref 8.4–10.5)
CAST: 0 /LPF — SIGNIFICANT CHANGE UP (ref 0–4)
CHLORIDE SERPL-SCNC: 99 MMOL/L — SIGNIFICANT CHANGE UP (ref 98–110)
CO2 SERPL-SCNC: 27 MMOL/L — SIGNIFICANT CHANGE UP (ref 17–32)
COLOR SPEC: YELLOW — SIGNIFICANT CHANGE UP
CREAT SERPL-MCNC: 0.8 MG/DL — SIGNIFICANT CHANGE UP (ref 0.7–1.5)
DIFF PNL FLD: NEGATIVE — SIGNIFICANT CHANGE UP
EGFR: 74 ML/MIN/1.73M2 — SIGNIFICANT CHANGE UP
EOSINOPHIL # BLD AUTO: 0.04 K/UL — SIGNIFICANT CHANGE UP (ref 0–0.7)
EOSINOPHIL NFR BLD AUTO: 0.6 % — SIGNIFICANT CHANGE UP (ref 0–8)
GLUCOSE SERPL-MCNC: 97 MG/DL — SIGNIFICANT CHANGE UP (ref 70–99)
GLUCOSE UR QL: NEGATIVE MG/DL — SIGNIFICANT CHANGE UP
HCT VFR BLD CALC: 47.1 % — HIGH (ref 37–47)
HGB BLD-MCNC: 15.4 G/DL — SIGNIFICANT CHANGE UP (ref 12–16)
IMM GRANULOCYTES NFR BLD AUTO: 0.2 % — SIGNIFICANT CHANGE UP (ref 0.1–0.3)
KETONES UR-MCNC: NEGATIVE MG/DL — SIGNIFICANT CHANGE UP
LEUKOCYTE ESTERASE UR-ACNC: ABNORMAL
LYMPHOCYTES # BLD AUTO: 1.41 K/UL — SIGNIFICANT CHANGE UP (ref 1.2–3.4)
LYMPHOCYTES # BLD AUTO: 21.4 % — SIGNIFICANT CHANGE UP (ref 20.5–51.1)
MCHC RBC-ENTMCNC: 30.8 PG — SIGNIFICANT CHANGE UP (ref 27–31)
MCHC RBC-ENTMCNC: 32.7 G/DL — SIGNIFICANT CHANGE UP (ref 32–37)
MCV RBC AUTO: 94.2 FL — SIGNIFICANT CHANGE UP (ref 81–99)
MONOCYTES # BLD AUTO: 0.41 K/UL — SIGNIFICANT CHANGE UP (ref 0.1–0.6)
MONOCYTES NFR BLD AUTO: 6.2 % — SIGNIFICANT CHANGE UP (ref 1.7–9.3)
NEUTROPHILS # BLD AUTO: 4.69 K/UL — SIGNIFICANT CHANGE UP (ref 1.4–6.5)
NEUTROPHILS NFR BLD AUTO: 71 % — SIGNIFICANT CHANGE UP (ref 42.2–75.2)
NITRITE UR-MCNC: NEGATIVE — SIGNIFICANT CHANGE UP
NRBC # BLD: 0 /100 WBCS — SIGNIFICANT CHANGE UP (ref 0–0)
NRBC BLD-RTO: 0 /100 WBCS — SIGNIFICANT CHANGE UP (ref 0–0)
PH UR: 7 — SIGNIFICANT CHANGE UP (ref 5–8)
PLATELET # BLD AUTO: 184 K/UL — SIGNIFICANT CHANGE UP (ref 130–400)
PMV BLD: 12 FL — HIGH (ref 7.4–10.4)
POTASSIUM SERPL-MCNC: 4.2 MMOL/L — SIGNIFICANT CHANGE UP (ref 3.5–5)
POTASSIUM SERPL-SCNC: 4.2 MMOL/L — SIGNIFICANT CHANGE UP (ref 3.5–5)
PROT SERPL-MCNC: 7.6 G/DL — SIGNIFICANT CHANGE UP (ref 6–8)
PROT UR-MCNC: NEGATIVE MG/DL — SIGNIFICANT CHANGE UP
RBC # BLD: 5 M/UL — SIGNIFICANT CHANGE UP (ref 4.2–5.4)
RBC # FLD: 12.7 % — SIGNIFICANT CHANGE UP (ref 11.5–14.5)
RBC CASTS # UR COMP ASSIST: 1 /HPF — SIGNIFICANT CHANGE UP (ref 0–4)
SODIUM SERPL-SCNC: 138 MMOL/L — SIGNIFICANT CHANGE UP (ref 135–146)
SP GR SPEC: 1.01 — SIGNIFICANT CHANGE UP (ref 1–1.03)
SQUAMOUS # UR AUTO: 1 /HPF — SIGNIFICANT CHANGE UP (ref 0–5)
UROBILINOGEN FLD QL: 0.2 MG/DL — SIGNIFICANT CHANGE UP (ref 0.2–1)
WBC # BLD: 6.6 K/UL — SIGNIFICANT CHANGE UP (ref 4.8–10.8)
WBC # FLD AUTO: 6.6 K/UL — SIGNIFICANT CHANGE UP (ref 4.8–10.8)
WBC UR QL: 26 /HPF — HIGH (ref 0–5)

## 2025-02-05 PROCEDURE — 87086 URINE CULTURE/COLONY COUNT: CPT

## 2025-02-05 PROCEDURE — 93005 ELECTROCARDIOGRAM TRACING: CPT

## 2025-02-05 PROCEDURE — 99214 OFFICE O/P EST MOD 30 MIN: CPT | Mod: 25

## 2025-02-05 PROCEDURE — 80053 COMPREHEN METABOLIC PANEL: CPT

## 2025-02-05 PROCEDURE — 85025 COMPLETE CBC W/AUTO DIFF WBC: CPT

## 2025-02-05 PROCEDURE — 81001 URINALYSIS AUTO W/SCOPE: CPT

## 2025-02-05 PROCEDURE — 36415 COLL VENOUS BLD VENIPUNCTURE: CPT

## 2025-02-05 PROCEDURE — 87186 SC STD MICRODIL/AGAR DIL: CPT

## 2025-02-05 NOTE — H&P PST ADULT - SOURCE OF INFORMATION, PROFILE
Hospitalist Progress Note    Patient: Maribell Mora MRN: [id number]  [facility]: [id number]    YOB: 1937  Age: 82 y.o. Sex: female    DOA: 3/20/2021 LOS:  LOS: 2 days          Assessment and Plan: Active Problems:    UTI (urinary tract infection) (11/26/2018)      AMS (altered mental status) (11/15/2020)      Altered mental status (3/20/2021)      26-year-old female with a history of multiple medical problems including chronic indwelling right nephrostomy tube, colostomy, diabetes, legally blind, hypertension, thyroid disease, status post above-knee amputation, chronic kidney disease admitted with altered mental status. Pseudomonas right pyelonephritis/associated with nephrostomy tube -  Will need nephrostomy tube removed  Will need meropenem for 14 days, today is day 2 out of 14, 2gram IV [id number]  Weekly labs qmonday  Will need midline/PICC  Appreciate infectious disease expertise, Dr. Ash Bernal    Drug-induced metabolic encephalopathy -  Dr. Ash Bernal has advised that this state will improve once medication has cleared her system    DM : Blood sugars less than 200, continue Lantus    Hypertension: continue home meds     Hypothyroidism: continue home meds    S: She is moaning in response to pain    Review of systems:    General: No fevers or chills. Cardiovascular: No chest pain or pressure. No palpitations. Pulmonary: No shortness of breath. Gastrointestinal: No nausea, vomiting. Objective:    Vital signs/Intake and Output:  Visit Vitals  BP (!) 187/76 (BP 1 Location: Left arm, BP Patient Position: At rest)   Pulse (!) 120   Temp 98.4 °F (36.9 °C)   Resp 18   Ht 5' 3\" (1.6 m)   Wt 64.3 kg (141 lb 11.2 oz)   SpO2 100%   BMI 25.10 kg/m²     Current Shift:  03/22 0701 - 03/22 1900  In: 0   Out: 120 [Urine:100]  Last three shifts:  03/20 1901 - 03/22 0700  In: 373 [I.V.:373]  Out: 960 [Urine:770]    Physical Exam:  General: NAD, AAOx3. Non-toxic. HEENT: NC/AT. PERRLA, EOMI.  MMM.   Lungs: Nml inspection. CTA B/L. No wheezing, rales or rhonchi. Heart:  S1S2 RRR,  PMI mid 5th IC space. No M/RG. Abdomen: Soft, NT/ND.  BS+. No peritoneal signs. Extremities: No C/C/E. Psych:   Nml affect. Neurologic:  2-12 intact. Strength 5/5 throughout. Sensation symmetrical.          Labs: Results:       Chemistry Recent Labs     03/22/21  0105 03/21/21  0404 03/20/21  1012   * 134* 199*    135* 133*   K 4.2 5.1 4.6    105 103   CO2 22 22 23   BUN 28* 32* 34*   CREA 1.22 1.45* 1.48*   CA 8.9 9.4 8.7   AGAP 7 8 7   BUCR 23* 22* 23*   * 179*  --    TP 6.6 7.4  --    ALB 2.3* 2.5*  --    GLOB 4.3* 4.9*  --    AGRAT 0.5* 0.5*  --       CBC w/Diff Recent Labs     03/22/21  0105 03/21/21  0404 03/20/21  1012   WBC 7.7 11.1 11.3   RBC 3.30* 3.53* 3.52*   HGB 9.2* 9.3* 9.8*   HCT 28.6* 30.6* 30.4*    485* 468*   GRANS 72 76* 80*   LYMPH 16* 12* 11*   EOS 3 2 3      Cardiac Enzymes No results for input(s): CPK, CKND1, JANE in the last 72 hours. No lab exists for component: CKRMB, TROIP   Coagulation No results for input(s): PTP, INR, APTT, INREXT, INREXT in the last 72 hours. Lipid Panel Lab Results   Component Value Date/Time    Cholesterol, total 115 08/28/2019 08:22 AM    HDL Cholesterol 39 (L) 08/28/2019 08:22 AM    LDL, calculated 46.2 08/28/2019 08:22 AM    VLDL, calculated 29.8 08/28/2019 08:22 AM    Triglyceride 149 08/28/2019 08:22 AM    CHOL/HDL Ratio 2.9 08/28/2019 08:22 AM      BNP No results for input(s): BNPP in the last 72 hours.    Liver Enzymes Recent Labs     03/22/21  0105   TP 6.6   ALB 2.3*   *      Thyroid Studies Lab Results   Component Value Date/Time    TSH 3.40 01/24/2020 08:35 AM        Procedures/imaging: see electronic medical records for all procedures/Xrays and details which were not copied into this note but were reviewed prior to creation of Plan patient

## 2025-02-05 NOTE — H&P PST ADULT - NSANTHOSAYNRD_GEN_A_CORE
No. MEY screening performed.  STOP BANG Legend: 0-2 = LOW Risk; 3-4 = INTERMEDIATE Risk; 5-8 = HIGH Risk

## 2025-02-05 NOTE — H&P PST ADULT - REASON FOR ADMISSION
85 y/o female presents to PAST in preparation for sacral nerve stimulation insertion of sacral wires in [facility] under LSB with Dr. Cameron on 2/19/25

## 2025-02-05 NOTE — H&P PST ADULT - HISTORY OF PRESENT ILLNESS
83 y/o female presents to PAST in preparation for sacral nerve stimulation insertion of sacral wires in [facility] under LSB with Dr. Cameron on 2/19/25   Pt with overactive bladder for many years that got worse in the past year. Pt had images completed that showed some compression deformities at L4-5, T12-L1 and sacral changes. Pt now for above procedure due to symptoms.    PATIENT/GUARDIAN CURRENTLY DENIES CHEST PAIN  SHORTNESS OF BREATH  PALPITATIONS,  DYSURIA, OR UPPER RESPIRATORY INFECTION IN PAST 2 WEEKS  Patient/Guardian understands instructions and was given the opportunity to ask questions and have them answered.  As per patient/guardian, this is their complete medical and surgical history, including medications both prescribed or over the counter.  written and verbal instructions with teach back on chlorhexidine shampoo provided,  pt verbalized understanding with returned demonstration    Anesthesia Alert  NO--Difficult Airway  NO--History of neck surgery or radiation  NO--Limited ROM of neck  NO--History of Malignant hyperthermia  NO--Personal or family history of Pseudocholinesterase deficiency.  NO--Prior Anesthesia Complication  NO--Latex Allergy  NO--Loose teeth-bottom bridge   NO--History of Rheumatoid Arthritis  NO--MEY  NO--Bleeding risk  NO--Other_____  Mallampati airway: Class    Revised Cardiac Risk Index for Pre-Operative Risk      RESULT SUMMARY:  0 points  RCRI Score    3.9 %  Risk of major cardiac event      INPUTS:  High-risk surgery —> 0 = No  History of ischemic heart disease —> 0 = No  History of congestive heart failure —> 0 = No  History of cerebrovascular disease —> 0 = No  Pre-operative treatment with insulin —> 0 = No  Pre-operative creatinine >2 mg/dL / 176.8 µmol/L —> 0 = No      Duke Activity Status Index (DASI)       RESULT SUMMARY:  34.7 points  The higher the score (maximum 58.2), the higher the functional status.    7.01 METs        INPUTS:  Take care of self —> 2.75 = Yes  Walk indoors —> 1.75 = Yes  Walk 1&ndash;2 blocks on level ground —> 2.75 = Yes  Climb a flight of stairs or walk up a hill —> 5.5 = Yes  Run a short distance —> 0 = No  Do light work around the house —> 2.7 = Yes  Do moderate work around the house —> 3.5 = Yes  Do heavy work around the house —> 0 = No  Do yardwork —> 4.5 = Yes  Have sexual relations —> 5.25 = Yes  Participate in moderate recreational activities —> 6 = Yes  Participate in strenuous sports —> 0 = No

## 2025-02-06 DIAGNOSIS — Z01.818 ENCOUNTER FOR OTHER PREPROCEDURAL EXAMINATION: ICD-10-CM

## 2025-02-06 DIAGNOSIS — R35.0 FREQUENCY OF MICTURITION: ICD-10-CM

## 2025-02-11 RX ORDER — AMOXICILLIN 500 MG/1
500 CAPSULE ORAL TWICE DAILY
Qty: 14 | Refills: 0 | Status: ACTIVE | COMMUNITY
Start: 2025-02-11 | End: 1900-01-01

## 2025-02-19 ENCOUNTER — OUTPATIENT (OUTPATIENT)
Dept: OUTPATIENT SERVICES | Facility: HOSPITAL | Age: 85
LOS: 1 days | Discharge: HOME | End: 2025-02-19
Payer: MEDICARE

## 2025-02-19 ENCOUNTER — TRANSCRIPTION ENCOUNTER (OUTPATIENT)
Age: 85
End: 2025-02-19

## 2025-02-19 ENCOUNTER — APPOINTMENT (OUTPATIENT)
Dept: UROLOGY | Facility: HOSPITAL | Age: 85
End: 2025-02-19

## 2025-02-19 VITALS
TEMPERATURE: 99 F | RESPIRATION RATE: 16 BRPM | WEIGHT: 132.06 LBS | HEIGHT: 62 IN | DIASTOLIC BLOOD PRESSURE: 81 MMHG | HEART RATE: 74 BPM | SYSTOLIC BLOOD PRESSURE: 138 MMHG | OXYGEN SATURATION: 98 %

## 2025-02-19 VITALS — RESPIRATION RATE: 18 BRPM | HEART RATE: 64 BPM | DIASTOLIC BLOOD PRESSURE: 60 MMHG | SYSTOLIC BLOOD PRESSURE: 124 MMHG

## 2025-02-19 DIAGNOSIS — Z98.49 CATARACT EXTRACTION STATUS, UNSPECIFIED EYE: Chronic | ICD-10-CM

## 2025-02-19 DIAGNOSIS — R35.0 FREQUENCY OF MICTURITION: ICD-10-CM

## 2025-02-19 DIAGNOSIS — Z98.890 OTHER SPECIFIED POSTPROCEDURAL STATES: Chronic | ICD-10-CM

## 2025-02-19 PROCEDURE — C1897: CPT

## 2025-02-19 PROCEDURE — 72170 X-RAY EXAM OF PELVIS: CPT

## 2025-02-19 PROCEDURE — 64561 IMPLANT NEUROELECTRODES: CPT | Mod: LT

## 2025-02-19 RX ORDER — HYDROCORTISONE 1 %
1 CREAM (GRAM) TOPICAL
Refills: 0 | DISCHARGE

## 2025-02-19 RX ORDER — HYDROMORPHONE HYDROCHLORIDE 4 MG/ML
0.5 INJECTION, SOLUTION INTRAMUSCULAR; INTRAVENOUS; SUBCUTANEOUS
Refills: 0 | Status: DISCONTINUED | OUTPATIENT
Start: 2025-02-19 | End: 2025-02-19

## 2025-02-19 RX ORDER — SODIUM CHLORIDE 9 G/ML
1000 INJECTION, SOLUTION INTRAVENOUS
Refills: 0 | Status: DISCONTINUED | OUTPATIENT
Start: 2025-02-19 | End: 2025-02-19

## 2025-02-19 RX ORDER — ATORVASTATIN CALCIUM 80 MG/1
1 TABLET, FILM COATED ORAL
Refills: 0 | DISCHARGE

## 2025-02-19 NOTE — BRIEF OPERATIVE NOTE - NSICDXBRIEFPROCEDURE_GEN_ALL_CORE_FT
PROCEDURES:  Percutaneous insertion of sacral nerve stimulator for trial 19-Feb-2025 14:37:42  Rodger Cameron

## 2025-02-19 NOTE — PACU DISCHARGE NOTE - PAIN:
Controlled with current regime [facility] phone call message- general phone call:    Reason for call: the Pharmacy called to let the Dr know the insurance company is sending a form for him to fill out      Return call needed: No    OK to leave a message on voice mail? Yes    Primary language: English      needed? No    Call taken on August 27, 2020 at 1:07 PM by Pierre Dukes

## 2025-02-19 NOTE — ASU PATIENT PROFILE, ADULT - FALL HARM RISK - HARM RISK INTERVENTIONS

## 2025-02-19 NOTE — ASU PATIENT PROFILE, ADULT - ABILITY TO HEAR (WITH HEARING AID OR HEARING APPLIANCE IF NORMALLY USED):
Medicare Cap   [x] Physical Therapy  [] Speech Therapy  [] Occupational therapy  *PT and Speech caps combine      $2230 Limit for PT and Speech combined  $2230 Limit for OT individually  At the beginning of the month where you expect to go over $2230, please add the [facility] modifier      Patient Name: Anastasia Mendez  YOB: 1948    Note:  This is an estimate of charges billed.      Date of [facility] Name # units/ charge $$$ charge Daily Total Charge Ongoing Total $$$   9/29 IE, TAct 1,1   131.05 Adequate: hears normal conversation without difficulty

## 2025-02-19 NOTE — ASU DISCHARGE PLAN (ADULT/PEDIATRIC) - FINANCIAL ASSISTANCE
[facility] provides services at a reduced cost to those who are determined to be eligible through [facility]’s financial assistance program. Information regarding [facility]’s financial assistance program can be found by going to [email or web address] or by calling [phone].

## 2025-02-19 NOTE — ASU PATIENT PROFILE, ADULT - NSICDXPASTSURGICALHX_GEN_ALL_CORE_FT
PAST SURGICAL HISTORY:  S/P cataract surgery Bilateral    S/P inguinal hernia repair      PAST SURGICAL HISTORY:  S/P cataract surgery Bilateral    S/P inguinal hernia repair left with mesh

## 2025-02-19 NOTE — ASU DISCHARGE PLAN (ADULT/PEDIATRIC) - NS MD DC FALL RISK RISK
For information on Fall & Injury Prevention, visit: [email or web address] OR  [email or web address] OR  https://www.cdc.gov/steadi/patient.html

## 2025-02-19 NOTE — BRIEF OPERATIVE NOTE - NSICDXBRIEFPREOP_GEN_ALL_CORE_FT
PRE-OP DIAGNOSIS:  Urge incontinence 19-Feb-2025 14:37:54  Cameron, Rodger  Urine frequency 19-Feb-2025 14:38:12  Rodger Cameron

## 2025-02-19 NOTE — ASU DISCHARGE PLAN (ADULT/PEDIATRIC) - ASU DC SPECIAL INSTRUCTIONSFT
Dr Cameron' office will call to schedule follow up for Monday 2/24 or Tuesday 2/25    Can take over the counter tylenol and/or motrin for pain Dr Cameron' office will call to schedule follow up for Monday 2/24 or Tuesday 2/25    Can take over the counter tylenol and/or motrin for pain    Do not remove dressing until seen in the office

## 2025-02-20 ENCOUNTER — NON-APPOINTMENT (OUTPATIENT)
Age: 85
End: 2025-02-20

## 2025-02-20 PROBLEM — I10 ESSENTIAL (PRIMARY) HYPERTENSION: Chronic | Status: ACTIVE | Noted: 2025-02-05

## 2025-02-20 PROBLEM — R42 DIZZINESS AND GIDDINESS: Chronic | Status: ACTIVE | Noted: 2025-02-05

## 2025-02-25 ENCOUNTER — APPOINTMENT (OUTPATIENT)
Dept: UROLOGY | Facility: CLINIC | Age: 85
End: 2025-02-25
Payer: MEDICARE

## 2025-02-25 DIAGNOSIS — E78.00 PURE HYPERCHOLESTEROLEMIA, UNSPECIFIED: ICD-10-CM

## 2025-02-25 DIAGNOSIS — I10 ESSENTIAL (PRIMARY) HYPERTENSION: ICD-10-CM

## 2025-02-25 DIAGNOSIS — R35.0 FREQUENCY OF MICTURITION: ICD-10-CM

## 2025-02-25 DIAGNOSIS — R39.15 URGENCY OF URINATION: ICD-10-CM

## 2025-02-25 DIAGNOSIS — N39.41 URGE INCONTINENCE: ICD-10-CM

## 2025-02-25 DIAGNOSIS — N32.81 OVERACTIVE BLADDER: ICD-10-CM

## 2025-02-25 PROCEDURE — 99024 POSTOP FOLLOW-UP VISIT: CPT

## 2025-03-19 ENCOUNTER — OUTPATIENT (OUTPATIENT)
Dept: OUTPATIENT SERVICES | Facility: HOSPITAL | Age: 85
LOS: 1 days | End: 2025-03-19
Payer: MEDICARE

## 2025-03-19 VITALS
WEIGHT: 132.06 LBS | HEIGHT: 63 IN | SYSTOLIC BLOOD PRESSURE: 131 MMHG | OXYGEN SATURATION: 97 % | RESPIRATION RATE: 16 BRPM | TEMPERATURE: 98 F | DIASTOLIC BLOOD PRESSURE: 88 MMHG | HEART RATE: 76 BPM

## 2025-03-19 DIAGNOSIS — Z98.49 CATARACT EXTRACTION STATUS, UNSPECIFIED EYE: Chronic | ICD-10-CM

## 2025-03-19 DIAGNOSIS — Z01.818 ENCOUNTER FOR OTHER PREPROCEDURAL EXAMINATION: ICD-10-CM

## 2025-03-19 DIAGNOSIS — Z98.890 OTHER SPECIFIED POSTPROCEDURAL STATES: Chronic | ICD-10-CM

## 2025-03-19 LAB
APPEARANCE UR: ABNORMAL
BACTERIA # UR AUTO: NEGATIVE /HPF — SIGNIFICANT CHANGE UP
BILIRUB UR-MCNC: NEGATIVE — SIGNIFICANT CHANGE UP
CAST: 0 /LPF — SIGNIFICANT CHANGE UP (ref 0–4)
DIFF PNL FLD: NEGATIVE — SIGNIFICANT CHANGE UP
GLUCOSE UR QL: NEGATIVE MG/DL — SIGNIFICANT CHANGE UP
KETONES UR-MCNC: NEGATIVE MG/DL — SIGNIFICANT CHANGE UP
LEUKOCYTE ESTERASE UR-ACNC: ABNORMAL
NITRITE UR-MCNC: NEGATIVE — SIGNIFICANT CHANGE UP
PH UR: 7.5 — SIGNIFICANT CHANGE UP (ref 5–8)
PROT UR-MCNC: NEGATIVE MG/DL — SIGNIFICANT CHANGE UP
RBC CASTS # UR COMP ASSIST: 3 /HPF — SIGNIFICANT CHANGE UP (ref 0–4)
SP GR SPEC: 1.02 — SIGNIFICANT CHANGE UP (ref 1–1.03)
SQUAMOUS # UR AUTO: 2 /HPF — SIGNIFICANT CHANGE UP (ref 0–5)
UROBILINOGEN FLD QL: 0.2 MG/DL — SIGNIFICANT CHANGE UP (ref 0.2–1)
WBC UR QL: 1 /HPF — SIGNIFICANT CHANGE UP (ref 0–5)

## 2025-03-19 PROCEDURE — 81001 URINALYSIS AUTO W/SCOPE: CPT

## 2025-03-19 PROCEDURE — 99214 OFFICE O/P EST MOD 30 MIN: CPT | Mod: 25

## 2025-03-19 PROCEDURE — 87086 URINE CULTURE/COLONY COUNT: CPT

## 2025-03-19 NOTE — H&P PST ADULT - HISTORY OF PRESENT ILLNESS
85 y/o female presents to PAST in preparation for sacral nerve stimulation  replacement  of sacral wires second  staged due to improvement in frequency, urgency and incontinence of urine. Pt with overactive bladder for many years that got worse in the past year. Pt had images completed that showed some compression deformities at L4-5, T12-L1 and sacral changes. Pt now for above procedure due to symptoms.  Patient denies any cp, sob, palpitations, fever, cough, URI, abdominal pains, N/V, UTI, Rashes or open wounds.  As per patient exercise tolerance of 1-2 fos walks with out sob  Today patient denies any signs and symptoms or exposure to covid.  Anesthesia Alert  YES Class IV--Difficult Airway  NO--History of neck surgery or radiation  NO--Limited ROM of neck  NO--History of Malignant hyperthermia  NO--Personal or family history of Pseudocholinesterase deficiency.  NO--Prior Anesthesia Complication  NO--Latex Allergy  NO--Loose teeth-bottom bridge   NO--History of Rheumatoid Arthritis  NO--MEY  NO--Bleeding risk  Duke Activity Status Index (DASI) from [facility]  on 3/19/2025  ** All calculations should be rechecked by clinician prior to use **  RESULT SUMMARY:  24.95 points  The higher the score (maximum 58.2), the higher the functional status.  5.81 METs  INPUTS:  Take care of self —> 2.75 = Yes  Walk indoors —> 1.75 = Yes  Walk 1&ndash;2 blocks on level ground —> 2.75 = Yes  Climb a flight of stairs or walk up a hill —> 5.5 = Yes  Run a short distance —> 0 = No  Do light work around the house —> 2.7 = Yes  Do moderate work around the house —> 3.5 = Yes  Do heavy work around the house —> 0 = No  Do yardwork —> 0 = No  Have sexual relations —> 0 = No  Participate in moderate recreational activities —> 6 = Yes  Participate in strenuous sports —> 0 = No  Revised Cardiac Risk Index for Pre-Operative Risk from [facility]  on 3/19/2025  ** All calculations should be rechecked by clinician prior to use **  RESULT SUMMARY:  1 points  RCRI Score  6.0 %  Risk of major cardiac event  INPUTS:  High-risk surgery —> 1 = Yes  History of ischemic heart disease —> 0 = No  History of congestive heart failure —> 0 = No  History of cerebrovascular disease —> 0 = No  Pre-operative treatment with insulin —> 0 = No  Pre-operative creatinine >2 mg/dL / 176.8 µmol/L —> 0 = No

## 2025-03-19 NOTE — H&P PST ADULT - REASON FOR ADMISSION
Case Type: OP  Suite: [facility]  Proceduralist: Rodger Cameron  Confirmed Surgery Date Time: 04- -  PAST Date Time: 03- - 15:15  Procedure: SACRAL NERVE STIMULATION REPLACEMENT OF SACRAL WIRES SECOND STAGED NEED C-ARM  Laterality: N/A  Length of Procedure: 60 Minutes  Anesthesia Type: Local Standby

## 2025-03-19 NOTE — H&P PST ADULT - NSICDXPASTSURGICALHX_GEN_ALL_CORE_FT
PAST SURGICAL HISTORY:  S/P cataract surgery Bilateral    S/P inguinal hernia repair left with mesh    S/P urological surgery

## 2025-03-20 DIAGNOSIS — Z01.818 ENCOUNTER FOR OTHER PREPROCEDURAL EXAMINATION: ICD-10-CM

## 2025-03-20 LAB
CULTURE RESULTS: NO GROWTH — SIGNIFICANT CHANGE UP
SPECIMEN SOURCE: SIGNIFICANT CHANGE UP

## 2025-04-09 ENCOUNTER — APPOINTMENT (OUTPATIENT)
Dept: UROLOGY | Facility: HOSPITAL | Age: 85
End: 2025-04-09

## 2025-04-09 ENCOUNTER — RESULT REVIEW (OUTPATIENT)
Age: 85
End: 2025-04-09

## 2025-04-09 ENCOUNTER — OUTPATIENT (OUTPATIENT)
Dept: OUTPATIENT SERVICES | Facility: HOSPITAL | Age: 85
LOS: 1 days | Discharge: HOME | End: 2025-04-09
Payer: MEDICARE

## 2025-04-09 ENCOUNTER — TRANSCRIPTION ENCOUNTER (OUTPATIENT)
Age: 85
End: 2025-04-09

## 2025-04-09 VITALS
HEART RATE: 69 BPM | WEIGHT: 132.06 LBS | TEMPERATURE: 98 F | DIASTOLIC BLOOD PRESSURE: 78 MMHG | SYSTOLIC BLOOD PRESSURE: 130 MMHG | HEIGHT: 62 IN | RESPIRATION RATE: 18 BRPM | OXYGEN SATURATION: 96 %

## 2025-04-09 VITALS — SYSTOLIC BLOOD PRESSURE: 116 MMHG | RESPIRATION RATE: 18 BRPM | DIASTOLIC BLOOD PRESSURE: 72 MMHG | HEART RATE: 72 BPM

## 2025-04-09 DIAGNOSIS — Z98.890 OTHER SPECIFIED POSTPROCEDURAL STATES: Chronic | ICD-10-CM

## 2025-04-09 DIAGNOSIS — Z98.49 CATARACT EXTRACTION STATUS, UNSPECIFIED EYE: Chronic | ICD-10-CM

## 2025-04-09 DIAGNOSIS — R35.0 FREQUENCY OF MICTURITION: ICD-10-CM

## 2025-04-09 PROCEDURE — C1778: CPT

## 2025-04-09 PROCEDURE — 64561 IMPLANT NEUROELECTRODES: CPT | Mod: RT

## 2025-04-09 PROCEDURE — 72170 X-RAY EXAM OF PELVIS: CPT | Mod: 26

## 2025-04-09 PROCEDURE — 72170 X-RAY EXAM OF PELVIS: CPT

## 2025-04-09 PROCEDURE — 64590 INS/RPL PRPH SAC/GSTR NPG/R: CPT

## 2025-04-09 PROCEDURE — C1767: CPT

## 2025-04-09 PROCEDURE — C1787: CPT

## 2025-04-09 RX ORDER — CEPHALEXIN 500 MG/1
500 CAPSULE ORAL 3 TIMES DAILY
Qty: 15 | Refills: 1 | Status: ACTIVE | COMMUNITY
Start: 2025-04-09 | End: 1900-01-01

## 2025-04-09 RX ORDER — HYDROMORPHONE/SOD CHLOR,ISO/PF 2 MG/10 ML
0.5 SYRINGE (ML) INJECTION
Refills: 0 | Status: DISCONTINUED | OUTPATIENT
Start: 2025-04-09 | End: 2025-04-09

## 2025-04-09 RX ORDER — SODIUM CHLORIDE 9 G/1000ML
1000 INJECTION, SOLUTION INTRAVENOUS
Refills: 0 | Status: DISCONTINUED | OUTPATIENT
Start: 2025-04-09 | End: 2025-04-09

## 2025-04-09 NOTE — PRE-ANESTHESIA EVALUATION ADULT - NSANTHPEFT_GEN_ALL_CORE
lung cta  cv rrr s1s2
Alert-The patient is alert, awake and responds to voice. The patient is oriented to time, place, and person. The triage nurse is able to obtain subjective information.

## 2025-04-09 NOTE — ASU DISCHARGE PLAN (ADULT/PEDIATRIC) - MEDICATION INSTRUCTIONS
You can take tylenol every 6 hours and advil every 8 hours as needed for pain. Please wait 3 hours between tylenol and advil.
Statement Selected

## 2025-04-09 NOTE — ASU DISCHARGE PLAN (ADULT/PEDIATRIC) - ASU DC SPECIAL INSTRUCTIONSFT
You can take tylenol every 6 hours and advil every 8 hours as needed for pain. Please wait 3 hours between tylenol and advil. You may shower in 48 hours. Do not remove dressings. They will be removed in Dr. Cameron's office during your appointment. You can take tylenol every 6 hours and advil every 8 hours as needed for pain. Please wait 3 hours between tylenol and advil. You may shower in 48 hours. Do not remove dressings. They will be removed in Dr. Cameron's office during your appointment. Keflex 500 mg 3 times daily has been sent to your pharmacy

## 2025-04-09 NOTE — ASU DISCHARGE PLAN (ADULT/PEDIATRIC) - FINANCIAL ASSISTANCE
[facility] provides services at a reduced cost to those who are determined to be eligible through [facility]’s financial assistance program. Information regarding [facility]’s financial assistance program can be found by going to [email or web address] or by calling [phone].

## 2025-04-09 NOTE — ASU DISCHARGE PLAN (ADULT/PEDIATRIC) - CARE PROVIDER_API CALL
Rodger Cameron  Urology  [address] [place] [address]  Phone: [phone]  Fax: [phone]  Follow Up Time: 1-3 days

## 2025-04-09 NOTE — ASU PATIENT PROFILE, ADULT - TEACHING/LEARNING EDUCATIONAL LEVEL
DOI: 5/31/2016  Initial Treatment Date: 5/31/2016  Date Last Seen: 1/9/2017  [facility]: Chronic/nonspecific injury  Occupation:   Referring by:Micah Dumont DC  Primary Care Physician: Joycelyn Saini MD  Date informed consent signed:  5/31/2016  ABN: Intact   I have reviewed the past medical history, family history, social history, medications and allergies listed in the medical record as obtained by my nursing staff and support staff and agree with their documentation.  Allergies, Medications, Medical history, Surgical history, Social history and Family history were reviewed and updated.  Aishwarya  reports that she has never smoked. She has never used smokeless tobacco.  Aishwarya is allergic to erythromycin.  Advance Directive Status: Intact  Visit Number of Current Episode: P.r.n.    Subjective:  Aishwarya is a 68 year old female who comes in today for follow up visit and treatment of mid to low back pain. She continues to improve and now rates her back pain primarily stiffness and tightness. She has been able to do more chores at home. Walking still difficult but she does his best she can. She is not able to truly exercise except for mostly seated-type stretching.    Past history includes: Patient indicates his been a problem for many years. Lately however it is getting worse. What she describes a shooting pain from the middle to her low back area. He does not appear to go into the legs. No sciatic pain, no paresthesia. No recent injury. The patient is a relatively sedentary 57-year-old. 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 She seen a chiropractor many years ago. She does not exercise. Some relief with ice packs and a massager that she uses at home. No recent chiropractic visits. 2013 CT of her back indicates diffuse arthritic change. No recent films. Otherwise in fair health. Her episodes of pain are very sporadic from a few times a day to multiple..            Patient rates her pain 6 /10 today     Medications currently taking:   Aspirin/anti-inflammatories, Coumadin, Flexeril, Percocet    On average how many times per week do you exercise for at least 20 minutes? 0  What type of exercise do you perform? Only simple walking doing her job related duties.    Do you smoke or use any tobacco products? No    Objective:      OBJECTIVE FINDINGS:   Objective disability index score from the initial encounter for this episode is 62 using the Oswestry questionnaire.    Lumbar: Patient moving fairly well from sitting to standing. Observation deconditioned female, truncal obesity, minimal right-sided convexity of 5-10 degrees at the thoracolumbar junction.  Mild generalized tenderness T6 through 8 with joint stress on the costovertebral junction., Soft Tissue Palpation: thoracolumbar and lumbosacral tenderness, bilateral today. Deconditioning erector spinae group bilaterally, left more tender than right today. No referred pain. Joint palpation elicits mild tenderness at the T 11-12, through the L4 5 S1, no gluteal/sciatic notch tenderness.    Patient Emotional screening normal affect quiet but pleasant     Relevant Diagnostic Imaging/Testin lumbar CT:IMPRESSION:  Multilevel mild to moderate disc degenerative and spondylotic  changes.  There is mild central canal and neural foraminal narrowing bilaterally at  L4-5.    2016 plain films:FINDINGS / IMPRESSION:        1.  Five lumbar-type vertebral bodies.  2.  No acute fracture or subluxation.  3.  Moderate intervertebral disc height loss and facet arthropathy at  L5-S1.  Mild intervertebral disc height loss at L2-L3.  4.  Marginal osteophyte formation at left L1-L2.  5.  Bilateral sacroiliac joint degeneration.  6.  Postsurgical clips within the right upper abdomen.  7.  Minimal atherosclerotic calcification of the distal aortoiliac system.    Assessment:  Thoracolumbosacral dysfunction, DJD, myalgia    Complicating  Factors/Co-morbidities:  Deconditioning, chronicity, bilateral knee replacements    Plan:  Mobilization rib heads T6 through T8 medial to laterally with palmar stress. Manual Pressure points thoracolumbar junction and erector spinae group bilaterally. Elbow contact tolerated. I utilized mobilization thoracolumbar segments, also lumbosacral junction segments as per listed above. This was using Gibson technique. Manual distraction to the thoracolumbar, and the L5-S1 to the lumbosacral junction. Light drop piece technique L5-S1 done prone to 2 repetitions.  Tolerance to all good.      Therapeutic Exercise/Rehab/Modalities performed today:  Intersegmental mobilization lumbar spine for 10 minutes.    Patient Instruction/Education:   Patient stated understanding of, and was in agreement with, the discussed instructions. The patient would like to try massage therapy as she has a coupon discomfort. I think to visits or possible to see if there is any help. She is not substantially interested in physical therapist doesn't know she could do the exercises. We talked about simple knee to chest exercises and moist heat.    Goals of Care: Goal of care is to improve muscular and skeletal function and provide symptom relief.  Prognosis good for some improvement unlikely resolution given deconditioned state.     Other treatment options discussed with patient  including both stretching and moist heat. At this point obstructive exercise class she says she can't do it given her current health status that is reasonable.    Patient is to return on an occasional basis/as needed for continued care and treatment of her condition consistent with plan of care.    Length of Visit: 15 minutes.             high school

## 2025-04-09 NOTE — BRIEF OPERATIVE NOTE - NSICDXBRIEFPROCEDURE_GEN_ALL_CORE_FT
PROCEDURES:  Insertion, electrode lead, nerve, sacral, percutaneous 09-Apr-2025 16:17:40  Cameron, Rodger  Insertion, neurostimulator, pudendal 09-Apr-2025 16:19:37  Cameron, Rodger

## 2025-04-09 NOTE — ASU DISCHARGE PLAN (ADULT/PEDIATRIC) - NS MD DC FALL RISK RISK
For information on Fall & Injury Prevention, visit: [email or web address] OR  [email or web address] OR  https://www.cdc.gov/steadi/patient.html

## 2025-04-10 ENCOUNTER — NON-APPOINTMENT (OUTPATIENT)
Age: 85
End: 2025-04-10

## 2025-04-11 ENCOUNTER — APPOINTMENT (OUTPATIENT)
Dept: UROLOGY | Facility: CLINIC | Age: 85
End: 2025-04-11

## 2025-04-11 VITALS
WEIGHT: 132 LBS | HEIGHT: 62 IN | DIASTOLIC BLOOD PRESSURE: 79 MMHG | TEMPERATURE: 98 F | OXYGEN SATURATION: 96 % | HEART RATE: 69 BPM | RESPIRATION RATE: 16 BRPM | SYSTOLIC BLOOD PRESSURE: 130 MMHG | BODY MASS INDEX: 24.29 KG/M2

## 2025-04-11 DIAGNOSIS — N39.41 URGE INCONTINENCE: ICD-10-CM

## 2025-04-11 DIAGNOSIS — R39.15 URGENCY OF URINATION: ICD-10-CM

## 2025-04-11 DIAGNOSIS — R35.0 FREQUENCY OF MICTURITION: ICD-10-CM

## 2025-04-11 DIAGNOSIS — R35.1 NOCTURIA: ICD-10-CM

## 2025-04-11 LAB
BILIRUB UR QL STRIP: NORMAL
COLLECTION METHOD: NORMAL
GLUCOSE UR-MCNC: NORMAL
HCG UR QL: 0.2 EU/DL
HGB UR QL STRIP.AUTO: NORMAL
KETONES UR-MCNC: NORMAL
LEUKOCYTE ESTERASE UR QL STRIP: NORMAL
NITRITE UR QL STRIP: NORMAL
PH UR STRIP: 7
PROT UR STRIP-MCNC: NORMAL
SP GR UR STRIP: 1.01

## 2025-04-11 PROCEDURE — 81003 URINALYSIS AUTO W/O SCOPE: CPT | Mod: QW

## 2025-04-11 PROCEDURE — 99024 POSTOP FOLLOW-UP VISIT: CPT

## 2025-04-17 DIAGNOSIS — R35.0 FREQUENCY OF MICTURITION: ICD-10-CM

## 2025-04-17 DIAGNOSIS — I10 ESSENTIAL (PRIMARY) HYPERTENSION: ICD-10-CM

## 2025-04-17 DIAGNOSIS — E78.00 PURE HYPERCHOLESTEROLEMIA, UNSPECIFIED: ICD-10-CM

## 2025-06-09 ENCOUNTER — APPOINTMENT (OUTPATIENT)
Dept: UROLOGY | Facility: CLINIC | Age: 85
End: 2025-06-09
Payer: MEDICARE

## 2025-06-09 ENCOUNTER — NON-APPOINTMENT (OUTPATIENT)
Age: 85
End: 2025-06-09

## 2025-06-09 VITALS
HEIGHT: 62 IN | BODY MASS INDEX: 24.29 KG/M2 | WEIGHT: 132 LBS | HEART RATE: 86 BPM | TEMPERATURE: 98 F | DIASTOLIC BLOOD PRESSURE: 75 MMHG | OXYGEN SATURATION: 95 % | RESPIRATION RATE: 17 BRPM | SYSTOLIC BLOOD PRESSURE: 123 MMHG

## 2025-06-09 LAB
BILIRUB UR QL STRIP: NORMAL
COLLECTION METHOD: NORMAL
GLUCOSE UR-MCNC: 100
HCG UR QL: 0.2 EU/DL
HGB UR QL STRIP.AUTO: NORMAL
KETONES UR-MCNC: NORMAL
LEUKOCYTE ESTERASE UR QL STRIP: NORMAL
NITRITE UR QL STRIP: POSITIVE
PH UR STRIP: 5.5
PROT UR STRIP-MCNC: NORMAL
SP GR UR STRIP: 1.01

## 2025-06-09 PROCEDURE — 81003 URINALYSIS AUTO W/O SCOPE: CPT | Mod: QW

## 2025-06-09 PROCEDURE — 99214 OFFICE O/P EST MOD 30 MIN: CPT

## 2025-06-09 PROCEDURE — G2211 COMPLEX E/M VISIT ADD ON: CPT

## 2025-06-09 RX ORDER — PHENAZOPYRIDINE HYDROCHLORIDE 100 MG/1
100 TABLET ORAL 3 TIMES DAILY
Qty: 9 | Refills: 5 | Status: ACTIVE | COMMUNITY
Start: 2025-06-09 | End: 1900-01-01

## 2025-06-11 ENCOUNTER — NON-APPOINTMENT (OUTPATIENT)
Age: 85
End: 2025-06-11

## 2025-06-11 LAB — BACTERIA UR CULT: NORMAL

## 2025-09-15 ENCOUNTER — APPOINTMENT (OUTPATIENT)
Dept: UROLOGY | Facility: CLINIC | Age: 85
End: 2025-09-15
Payer: MEDICARE

## 2025-09-15 VITALS
OXYGEN SATURATION: 98 % | SYSTOLIC BLOOD PRESSURE: 119 MMHG | HEIGHT: 62 IN | WEIGHT: 132 LBS | HEART RATE: 81 BPM | DIASTOLIC BLOOD PRESSURE: 72 MMHG | BODY MASS INDEX: 24.29 KG/M2 | RESPIRATION RATE: 16 BRPM | TEMPERATURE: 97.6 F

## 2025-09-15 DIAGNOSIS — R35.1 NOCTURIA: ICD-10-CM

## 2025-09-15 DIAGNOSIS — N39.41 URGE INCONTINENCE: ICD-10-CM

## 2025-09-15 DIAGNOSIS — S32.10XA UNSPECIFIED FRACTURE OF SACRUM, INITIAL ENCOUNTER FOR CLOSED FRACTURE: ICD-10-CM

## 2025-09-15 DIAGNOSIS — R39.15 URGENCY OF URINATION: ICD-10-CM

## 2025-09-15 DIAGNOSIS — R35.0 FREQUENCY OF MICTURITION: ICD-10-CM

## 2025-09-15 PROCEDURE — G2211 COMPLEX E/M VISIT ADD ON: CPT

## 2025-09-15 PROCEDURE — 99214 OFFICE O/P EST MOD 30 MIN: CPT

## 2025-09-15 RX ORDER — MIRABEGRON 25 MG/1
25 TABLET, EXTENDED RELEASE ORAL
Qty: 30 | Refills: 5 | Status: ACTIVE | COMMUNITY
Start: 2025-09-15 | End: 1900-01-01